# Patient Record
Sex: FEMALE | Race: WHITE | Employment: OTHER | URBAN - METROPOLITAN AREA
[De-identification: names, ages, dates, MRNs, and addresses within clinical notes are randomized per-mention and may not be internally consistent; named-entity substitution may affect disease eponyms.]

---

## 2020-04-09 ENCOUNTER — HOSPITAL ENCOUNTER (OUTPATIENT)
Dept: PREADMISSION TESTING | Age: 85
Discharge: HOME | End: 2020-04-09
Payer: MEDICARE

## 2020-04-09 DIAGNOSIS — R31.0 GROSS HEMATURIA: ICD-10-CM

## 2020-04-09 LAB
ANION GAP SERPL CALC-SCNC: 5 MMOL/L (ref 3–18)
ATRIAL RATE: 67 BPM
BUN SERPL-MCNC: 42 MG/DL (ref 7–18)
BUN/CREAT SERPL: 17 (ref 12–20)
CALCIUM SERPL-MCNC: 9 MG/DL (ref 8.5–10.1)
CALCULATED P AXIS, ECG09: 79 DEGREES
CALCULATED R AXIS, ECG10: 45 DEGREES
CALCULATED T AXIS, ECG11: 75 DEGREES
CHLORIDE SERPL-SCNC: 109 MMOL/L (ref 100–111)
CO2 SERPL-SCNC: 27 MMOL/L (ref 21–32)
CREAT SERPL-MCNC: 2.45 MG/DL (ref 0.6–1.3)
DIAGNOSIS, 93000: NORMAL
GLUCOSE SERPL-MCNC: 89 MG/DL (ref 74–99)
HCT VFR BLD AUTO: 38.5 % (ref 35–45)
HGB BLD-MCNC: 12.5 G/DL (ref 12–16)
P-R INTERVAL, ECG05: 184 MS
POTASSIUM SERPL-SCNC: 5.4 MMOL/L (ref 3.5–5.5)
Q-T INTERVAL, ECG07: 406 MS
QRS DURATION, ECG06: 104 MS
QTC CALCULATION (BEZET), ECG08: 429 MS
SODIUM SERPL-SCNC: 141 MMOL/L (ref 136–145)
VENTRICULAR RATE, ECG03: 67 BPM

## 2020-04-09 PROCEDURE — 85018 HEMOGLOBIN: CPT

## 2020-04-09 PROCEDURE — 80048 BASIC METABOLIC PNL TOTAL CA: CPT

## 2020-04-09 PROCEDURE — 36415 COLL VENOUS BLD VENIPUNCTURE: CPT

## 2020-04-09 PROCEDURE — 93005 ELECTROCARDIOGRAM TRACING: CPT

## 2020-04-20 ENCOUNTER — ANESTHESIA EVENT (OUTPATIENT)
Dept: SURGERY | Age: 85
End: 2020-04-20
Payer: MEDICARE

## 2020-04-20 ENCOUNTER — HOSPITAL ENCOUNTER (OUTPATIENT)
Dept: NUCLEAR MEDICINE | Age: 85
Discharge: HOME | End: 2020-04-20
Attending: INTERNAL MEDICINE
Payer: MEDICARE

## 2020-04-20 ENCOUNTER — HOSPITAL ENCOUNTER (OUTPATIENT)
Dept: NON INVASIVE DIAGNOSTICS | Age: 85
Discharge: HOME | End: 2020-04-20
Attending: INTERNAL MEDICINE
Payer: MEDICARE

## 2020-04-20 VITALS
DIASTOLIC BLOOD PRESSURE: 77 MMHG | WEIGHT: 202 LBS | HEIGHT: 61 IN | BODY MASS INDEX: 38.14 KG/M2 | SYSTOLIC BLOOD PRESSURE: 159 MMHG

## 2020-04-20 VITALS
SYSTOLIC BLOOD PRESSURE: 147 MMHG | DIASTOLIC BLOOD PRESSURE: 99 MMHG | BODY MASS INDEX: 40.78 KG/M2 | HEIGHT: 61 IN | WEIGHT: 216 LBS

## 2020-04-20 DIAGNOSIS — Z01.810 PREOP CARDIOVASCULAR EXAM: ICD-10-CM

## 2020-04-20 DIAGNOSIS — R94.31 ABNORMAL ELECTROCARDIOGRAM: ICD-10-CM

## 2020-04-20 LAB
AV VELOCITY RATIO: 0.63
AV VTI RATIO: 0.6
ECHO AO ROOT DIAM: 3.3 CM
ECHO AV AREA PEAK VELOCITY: 1.7 CM2
ECHO AV AREA VTI: 1.7 CM2
ECHO AV AREA/BSA PEAK VELOCITY: 0.8 CM2/M2
ECHO AV AREA/BSA VTI: 0.8 CM2/M2
ECHO AV MEAN GRADIENT: 4.5 MMHG
ECHO AV MEAN VELOCITY: 0.98 M/S
ECHO AV PEAK GRADIENT: 8.4 MMHG
ECHO AV PEAK VELOCITY: 144.82 CM/S
ECHO AV VTI: 32.44 CM
ECHO IVC PROX: 1.1 CM
ECHO LA MAJOR AXIS: 2.98 CM
ECHO LA VOL 2C: 61.32 ML (ref 22–52)
ECHO LA VOL 4C: 47.7 ML (ref 22–52)
ECHO LA VOL BP: 58.18 ML (ref 22–52)
ECHO LA VOL/BSA BIPLANE: 29.81 ML/M2 (ref 16–28)
ECHO LA VOLUME INDEX A2C: 31.42 ML/M2 (ref 16–28)
ECHO LA VOLUME INDEX A4C: 24.44 ML/M2 (ref 16–28)
ECHO LV E' LATERAL VELOCITY: 7 CM/S
ECHO LV E' SEPTAL VELOCITY: 5 CM/S
ECHO LV EDV A2C: 71.8 ML
ECHO LV EDV A4C: 87.5 ML
ECHO LV EDV BP: 72 ML (ref 56–104)
ECHO LV EDV INDEX A4C: 44.8 ML/M2
ECHO LV EDV INDEX BP: 36.9 ML/M2
ECHO LV EDV NDEX A2C: 36.8 ML/M2
ECHO LV EDV TEICHHOLZ: 0.7 ML
ECHO LV EJECTION FRACTION A2C: 52 %
ECHO LV EJECTION FRACTION A4C: 59 %
ECHO LV EJECTION FRACTION BIPLANE: 57.4 % (ref 55–100)
ECHO LV ESV A2C: 34.3 ML
ECHO LV ESV A4C: 36.1 ML
ECHO LV ESV BP: 30.7 ML (ref 19–49)
ECHO LV ESV INDEX A2C: 17.6 ML/M2
ECHO LV ESV INDEX A4C: 18.5 ML/M2
ECHO LV ESV INDEX BP: 15.7 ML/M2
ECHO LV ESV TEICHHOLZ: 0.29 ML
ECHO LV INTERNAL DIMENSION DIASTOLIC: 4.81 CM (ref 3.9–5.3)
ECHO LV INTERNAL DIMENSION SYSTOLIC: 3.32 CM
ECHO LV IVSD: 0.93 CM (ref 0.6–0.9)
ECHO LV MASS 2D: 160.2 G (ref 67–162)
ECHO LV MASS INDEX 2D: 82.1 G/M2 (ref 43–95)
ECHO LV POSTERIOR WALL DIASTOLIC: 0.79 CM (ref 0.6–0.9)
ECHO LVOT DIAM: 1.84 CM
ECHO LVOT PEAK GRADIENT: 3.3 MMHG
ECHO LVOT PEAK VELOCITY: 91.06 CM/S
ECHO LVOT VTI: 20.37 CM
ECHO MV A VELOCITY: 91.79 CM/S
ECHO MV AREA PHT: 2.9 CM2
ECHO MV E DECELERATION TIME (DT): 257.2 MS
ECHO MV E VELOCITY: 80.63 CM/S
ECHO MV E/A RATIO: 0.88
ECHO MV E/E' LATERAL: 11.52
ECHO MV E/E' RATIO (AVERAGED): 13.82
ECHO MV E/E' SEPTAL: 16.13
ECHO MV PRESSURE HALF TIME (PHT): 74.6 MS
ECHO RA AREA 4C: 13.75 CM2
ECHO RV INTERNAL DIMENSION: 2.63 CM
ECHO TRICUSPID ANNULAR PEAK SYSTOLIC VELOCITY: 1.7 CM/S
LVFS 2D: 30.98 %
LVOT MG: 1.52 MMHG
LVOT MV: 0.57 CM/S
LVSV (MOD BI): 20.15 ML
LVSV (MOD SINGLE 4C): 25.1 ML
LVSV (MOD SINGLE): 18.31 ML
LVSV (TEICH): 30.92 ML
MV DEC SLOPE: 3.14

## 2020-04-20 PROCEDURE — C8929 TTE W OR WO FOL WCON,DOPPLER: HCPCS

## 2020-04-20 PROCEDURE — 74011250636 HC RX REV CODE- 250/636

## 2020-04-20 PROCEDURE — 93017 CV STRESS TEST TRACING ONLY: CPT

## 2020-04-20 PROCEDURE — 74011250636 HC RX REV CODE- 250/636: Performed by: INTERNAL MEDICINE

## 2020-04-20 PROCEDURE — A9500 TC99M SESTAMIBI: HCPCS

## 2020-04-20 RX ADMIN — PERFLUTREN 1 ML: 6.52 INJECTION, SUSPENSION INTRAVENOUS at 11:27

## 2020-04-20 RX ADMIN — REGADENOSON 0.4 MG: 0.08 INJECTION, SOLUTION INTRAVENOUS at 10:55

## 2020-04-21 ENCOUNTER — HOSPITAL ENCOUNTER (OUTPATIENT)
Age: 85
Setting detail: OUTPATIENT SURGERY
Discharge: HOME | End: 2020-04-21
Attending: UROLOGY | Admitting: UROLOGY
Payer: MEDICARE

## 2020-04-21 ENCOUNTER — ANESTHESIA (OUTPATIENT)
Dept: SURGERY | Age: 85
End: 2020-04-21
Payer: MEDICARE

## 2020-04-21 VITALS
WEIGHT: 212.56 LBS | SYSTOLIC BLOOD PRESSURE: 120 MMHG | RESPIRATION RATE: 16 BRPM | BODY MASS INDEX: 39.12 KG/M2 | HEIGHT: 62 IN | DIASTOLIC BLOOD PRESSURE: 66 MMHG | HEART RATE: 88 BPM | OXYGEN SATURATION: 96 % | TEMPERATURE: 97.3 F

## 2020-04-21 PROBLEM — N32.89 BLADDER MASS: Status: ACTIVE | Noted: 2020-04-21

## 2020-04-21 PROCEDURE — 74011000250 HC RX REV CODE- 250: Performed by: NURSE ANESTHETIST, CERTIFIED REGISTERED

## 2020-04-21 PROCEDURE — 77030006643: Performed by: ANESTHESIOLOGY

## 2020-04-21 PROCEDURE — 77030008477 HC STYL SATN SLP COVD -A: Performed by: ANESTHESIOLOGY

## 2020-04-21 PROCEDURE — 76210000016 HC OR PH I REC 1 TO 1.5 HR: Performed by: UROLOGY

## 2020-04-21 PROCEDURE — 74011250636 HC RX REV CODE- 250/636: Performed by: UROLOGY

## 2020-04-21 PROCEDURE — 88307 TISSUE EXAM BY PATHOLOGIST: CPT

## 2020-04-21 PROCEDURE — 77030040361 HC SLV COMPR DVT MDII -B: Performed by: UROLOGY

## 2020-04-21 PROCEDURE — 74011250636 HC RX REV CODE- 250/636: Performed by: NURSE ANESTHETIST, CERTIFIED REGISTERED

## 2020-04-21 PROCEDURE — 76010000131 HC OR TIME 2 TO 2.5 HR: Performed by: UROLOGY

## 2020-04-21 PROCEDURE — 88305 TISSUE EXAM BY PATHOLOGIST: CPT

## 2020-04-21 PROCEDURE — 77030018832 HC SOL IRR H20 ICUM -A: Performed by: UROLOGY

## 2020-04-21 PROCEDURE — 76060000035 HC ANESTHESIA 2 TO 2.5 HR: Performed by: UROLOGY

## 2020-04-21 PROCEDURE — 76210000021 HC REC RM PH II 0.5 TO 1 HR: Performed by: UROLOGY

## 2020-04-21 PROCEDURE — 77030020782 HC GWN BAIR PAWS FLX 3M -B: Performed by: UROLOGY

## 2020-04-21 PROCEDURE — 77030005546 HC CATH URETH FOL 3W BARD -A: Performed by: UROLOGY

## 2020-04-21 PROCEDURE — 77030008683 HC TU ET CUF COVD -A: Performed by: ANESTHESIOLOGY

## 2020-04-21 PROCEDURE — 74011000258 HC RX REV CODE- 258: Performed by: UROLOGY

## 2020-04-21 PROCEDURE — 77030040830 HC CATH URETH FOL MDII -A: Performed by: UROLOGY

## 2020-04-21 RX ORDER — OXYCODONE HYDROCHLORIDE 5 MG/1
5 TABLET ORAL
Status: DISCONTINUED | OUTPATIENT
Start: 2020-04-21 | End: 2020-04-21 | Stop reason: HOSPADM

## 2020-04-21 RX ORDER — FLUMAZENIL 0.1 MG/ML
0.2 INJECTION INTRAVENOUS
Status: DISCONTINUED | OUTPATIENT
Start: 2020-04-21 | End: 2020-04-21 | Stop reason: HOSPADM

## 2020-04-21 RX ORDER — CEPHALEXIN 500 MG/1
500 CAPSULE ORAL 2 TIMES DAILY
Qty: 6 CAP | Refills: 0 | Status: SHIPPED | OUTPATIENT
Start: 2020-04-21 | End: 2020-04-24

## 2020-04-21 RX ORDER — SODIUM CHLORIDE 0.9 % (FLUSH) 0.9 %
5-40 SYRINGE (ML) INJECTION EVERY 8 HOURS
Status: DISCONTINUED | OUTPATIENT
Start: 2020-04-21 | End: 2020-04-21 | Stop reason: HOSPADM

## 2020-04-21 RX ORDER — ROCURONIUM BROMIDE 10 MG/ML
INJECTION, SOLUTION INTRAVENOUS AS NEEDED
Status: DISCONTINUED | OUTPATIENT
Start: 2020-04-21 | End: 2020-04-21 | Stop reason: HOSPADM

## 2020-04-21 RX ORDER — SODIUM CHLORIDE, SODIUM LACTATE, POTASSIUM CHLORIDE, CALCIUM CHLORIDE 600; 310; 30; 20 MG/100ML; MG/100ML; MG/100ML; MG/100ML
75 INJECTION, SOLUTION INTRAVENOUS CONTINUOUS
Status: DISCONTINUED | OUTPATIENT
Start: 2020-04-21 | End: 2020-04-21 | Stop reason: HOSPADM

## 2020-04-21 RX ORDER — PHENYLEPHRINE HYDROCHLORIDE 10 MG/ML
INJECTION INTRAVENOUS AS NEEDED
Status: DISCONTINUED | OUTPATIENT
Start: 2020-04-21 | End: 2020-04-21 | Stop reason: HOSPADM

## 2020-04-21 RX ORDER — NALOXONE HYDROCHLORIDE 0.4 MG/ML
0.1 INJECTION, SOLUTION INTRAMUSCULAR; INTRAVENOUS; SUBCUTANEOUS AS NEEDED
Status: DISCONTINUED | OUTPATIENT
Start: 2020-04-21 | End: 2020-04-21 | Stop reason: HOSPADM

## 2020-04-21 RX ORDER — SODIUM CHLORIDE 0.9 % (FLUSH) 0.9 %
5-40 SYRINGE (ML) INJECTION AS NEEDED
Status: DISCONTINUED | OUTPATIENT
Start: 2020-04-21 | End: 2020-04-21 | Stop reason: HOSPADM

## 2020-04-21 RX ORDER — CEFAZOLIN SODIUM 2 G/50ML
2 SOLUTION INTRAVENOUS ONCE
Status: COMPLETED | OUTPATIENT
Start: 2020-04-21 | End: 2020-04-21

## 2020-04-21 RX ORDER — LIDOCAINE HYDROCHLORIDE 20 MG/ML
INJECTION, SOLUTION EPIDURAL; INFILTRATION; INTRACAUDAL; PERINEURAL AS NEEDED
Status: DISCONTINUED | OUTPATIENT
Start: 2020-04-21 | End: 2020-04-21 | Stop reason: HOSPADM

## 2020-04-21 RX ORDER — PROPOFOL 10 MG/ML
INJECTION, EMULSION INTRAVENOUS AS NEEDED
Status: DISCONTINUED | OUTPATIENT
Start: 2020-04-21 | End: 2020-04-21 | Stop reason: HOSPADM

## 2020-04-21 RX ORDER — PROCHLORPERAZINE EDISYLATE 5 MG/ML
5 INJECTION INTRAMUSCULAR; INTRAVENOUS
Status: DISCONTINUED | OUTPATIENT
Start: 2020-04-21 | End: 2020-04-21 | Stop reason: HOSPADM

## 2020-04-21 RX ORDER — PHENAZOPYRIDINE HYDROCHLORIDE 100 MG/1
100 TABLET, FILM COATED ORAL
Qty: 9 TAB | Refills: 0 | Status: SHIPPED | OUTPATIENT
Start: 2020-04-21 | End: 2020-04-24

## 2020-04-21 RX ORDER — FENTANYL CITRATE 50 UG/ML
25 INJECTION, SOLUTION INTRAMUSCULAR; INTRAVENOUS AS NEEDED
Status: DISCONTINUED | OUTPATIENT
Start: 2020-04-21 | End: 2020-04-21 | Stop reason: HOSPADM

## 2020-04-21 RX ORDER — CEFAZOLIN SODIUM 2 G/50ML
SOLUTION INTRAVENOUS
Status: COMPLETED
Start: 2020-04-21 | End: 2020-04-21

## 2020-04-21 RX ORDER — FENTANYL CITRATE 50 UG/ML
INJECTION, SOLUTION INTRAMUSCULAR; INTRAVENOUS AS NEEDED
Status: DISCONTINUED | OUTPATIENT
Start: 2020-04-21 | End: 2020-04-21 | Stop reason: HOSPADM

## 2020-04-21 RX ORDER — SODIUM CHLORIDE, SODIUM LACTATE, POTASSIUM CHLORIDE, CALCIUM CHLORIDE 600; 310; 30; 20 MG/100ML; MG/100ML; MG/100ML; MG/100ML
125 INJECTION, SOLUTION INTRAVENOUS CONTINUOUS
Status: DISCONTINUED | OUTPATIENT
Start: 2020-04-21 | End: 2020-04-21 | Stop reason: HOSPADM

## 2020-04-21 RX ORDER — ONDANSETRON 2 MG/ML
INJECTION INTRAMUSCULAR; INTRAVENOUS AS NEEDED
Status: DISCONTINUED | OUTPATIENT
Start: 2020-04-21 | End: 2020-04-21 | Stop reason: HOSPADM

## 2020-04-21 RX ORDER — DEXAMETHASONE SODIUM PHOSPHATE 4 MG/ML
INJECTION, SOLUTION INTRA-ARTICULAR; INTRALESIONAL; INTRAMUSCULAR; INTRAVENOUS; SOFT TISSUE AS NEEDED
Status: DISCONTINUED | OUTPATIENT
Start: 2020-04-21 | End: 2020-04-21 | Stop reason: HOSPADM

## 2020-04-21 RX ADMIN — PHENYLEPHRINE HYDROCHLORIDE 50 MCG: 10 INJECTION INTRAVENOUS at 12:05

## 2020-04-21 RX ADMIN — PROPOFOL 100 MG: 10 INJECTION, EMULSION INTRAVENOUS at 11:07

## 2020-04-21 RX ADMIN — CEFAZOLIN SODIUM 2 G: 2 SOLUTION INTRAVENOUS at 10:59

## 2020-04-21 RX ADMIN — DEXAMETHASONE SODIUM PHOSPHATE 4 MG: 4 INJECTION, SOLUTION INTRAMUSCULAR; INTRAVENOUS at 11:30

## 2020-04-21 RX ADMIN — GEMCITABINE 1000 MG: 38 INJECTION INTRAVENOUS at 12:48

## 2020-04-21 RX ADMIN — PHENYLEPHRINE HYDROCHLORIDE 50 MCG: 10 INJECTION INTRAVENOUS at 12:15

## 2020-04-21 RX ADMIN — FENTANYL CITRATE 25 MCG: 50 INJECTION, SOLUTION INTRAMUSCULAR; INTRAVENOUS at 11:42

## 2020-04-21 RX ADMIN — SODIUM CHLORIDE, SODIUM LACTATE, POTASSIUM CHLORIDE, AND CALCIUM CHLORIDE 125 ML/HR: 600; 310; 30; 20 INJECTION, SOLUTION INTRAVENOUS at 10:44

## 2020-04-21 RX ADMIN — LIDOCAINE HYDROCHLORIDE 100 MG: 20 INJECTION, SOLUTION EPIDURAL; INFILTRATION; INTRACAUDAL; PERINEURAL at 11:03

## 2020-04-21 RX ADMIN — ONDANSETRON HYDROCHLORIDE 4 MG: 2 INJECTION INTRAMUSCULAR; INTRAVENOUS at 11:30

## 2020-04-21 RX ADMIN — Medication 50 MG: at 11:07

## 2020-04-21 RX ADMIN — FENTANYL CITRATE 50 MCG: 50 INJECTION, SOLUTION INTRAMUSCULAR; INTRAVENOUS at 11:03

## 2020-04-21 RX ADMIN — SUGAMMADEX 100 MG: 100 INJECTION, SOLUTION INTRAVENOUS at 12:50

## 2020-04-21 RX ADMIN — FENTANYL CITRATE 25 MCG: 50 INJECTION, SOLUTION INTRAMUSCULAR; INTRAVENOUS at 11:57

## 2020-04-21 NOTE — H&P (VIEW-ONLY)
Urology [address] Dates [id number] Tel: [phone] Fax: [phone] Patient: Jesús Egan YOB: 1935 Assessment/Plan # Detail Type Description 1. 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 Patient Plan She has a large 5.5cm bladder mass that's occupying the majority of the right side wall of the bladder moving towards the back wall of the bladder. We discussed that this is most assuredly bladder cancer considering her pos FISH tests as well. We discussed that tx would be a transurethral resection of bladder tumor, large with intravesical installation of Gemcitabine. Risk of bleeding, infection, injury to the bladder and possible need for future procedures to remain tumor free were discussed. She will proceed. We also discussed there's a possibility of needing a prolonged cath afterwards as well. 2. Assessment Gross hematuria (R31.0). Patient Plan This is undoubtedly due to her large bladder tumor. This 81year old female presents for Hematuria. History of Present Illness: 1. Hematuria The patient presents with hematuria (gross). The problem began 2 months ago. The symptoms are intermittent. The problem is unchanged. She presents with pain in the flank and left groin. The patient rates the pain 6/10. Pertinent history includes being at least 37years of age and family history of stones but not daily aspirin use, history of bladder cancer, history of irritative voiding symptoms, prior history of stones, history of renal cancer, history of UTIs and family history of bladder cancer or family history of renal cancer. Denies aggravating factors. Denies relieving factors. She denies chills, fever, nausea and vomiting. Additional information: US (3/2020) - negative for upper tract disease Cytology (02/20/2020):  POSITIVE FISH Cystoscopy (4/2/2020) -- large 6 cm bladder tumor. PAST MEDICAL/SURGICAL HISTORY   (Reviewed, updated) Disease/disorder Onset Date Management Date Comments Arthritis Hypertension Thyroid disease PROBLEM LIST:   Problem List reviewed. Medication Reconciliation Medications reconciled today. Allergies Ingredient Reaction (Severity) Medication Name Comment NO KNOWN ALLERGIES Reviewed, no changes. Family History  (Reviewed, updated) Relationship Family Member Name  Age at Death Condition Onset Age Cause of Death Family h/o    Urolithiasis Social History:  (Reviewed, updated) Tobacco use reviewed. Preferred language is [place]. MARITAL STATUS/FAMILY/SOCIAL SUPPORT Marital status:  Smoking status: Unknown if ever smoked. TOBACCO SCREENING: 
Patient has used tobacco.  
 
SMOKING STATUS Type Smoking Status Usage Per Day Years Used Pack Years Total Pack Years Unknown if ever smoked ALCOHOL There is a history of alcohol use. CAFFEINE The patient uses caffeine: coffee. Review of Systems System Neg/Pos Details Constitutional Positive Pain. Constitutional Negative Chills and Fever. ENMT Negative Ear infections and Sore throat. Eyes Negative Blurred vision, Double vision and Eye pain. Respiratory Negative Asthma, Chronic cough, Dyspnea and Wheezing. Cardio Negative Chest pain. GI Negative Constipation, Decreased appetite, Diarrhea, Nausea and Vomiting.  Positive Hematuria. Endocrine Negative Cold intolerance, Heat intolerance, Increased thirst and Weight loss. Neuro Negative Headache and Tremors. Psych Negative Anxiety and Depression. Integumentary Negative Itching skin and Rash. MS Negative Back pain and Joint pain. Hema/Lymph Negative Easy bleeding. Vital Signs Height Time ft in cm Last Measured Height Position 10:56 AM 5.0 1.00 154.94 2020 0 Weight/BSA/BMI Time lb oz kg Context BMI kg/m2 BSA m2  
 10:56 .30  91.762  38.22 Pain Scale Time Pain Score Method 10:56 AM 6/10 Numeric Pain Intensity Scale Measured By Time Measured by  
10:56 AM [facility] Physical Exam 
Exam Findings Details Constitutional Normal Well developed. Neck Exam Normal Inspection - Normal.  
Respiratory Normal Inspection - Normal.  
Abdomen Normal No abdominal tenderness. Genitourinary Normal No Suprapubic tenderness. No CVA tenderness. Extremity Normal No Edema. Psychiatric Normal Orientation - Oriented to time, place, person & situation. Appropriate mood and affect. Procedures: 
 
 
Cystoscopy indication: 
Bladder. Patient consent: 
Consent was obtained. The procedure and risks were explained in detail. Questions were encouraged and answered. The patient was prepped and draped in the usual sterile fashion. Procedure: A diagnostic cystourethroscopy was performed using a 16 Russian flexible cystoscope Anesthesia: 
No anesthesia. Patient position: 
Dorsal lithotomy. Patient response: 
Patient tolerated procedure well. Patient was given instructions. Patient was discharged in stable condition. Findings: Anterior urethra normal in appearance. The bladder has lesion/mass found on the bladder. The first lesion/mass is 6cm is located right side of the bladder with characteristics of papillary. Ureteral orifices normal in appearance. Antibiotics: Antibiotics given:  Cipro Impression: 
Gross hematuria R31.0. Active Patient Care Team Members Name Contact Agency Type Support Role Relationship Active Date Inactive Date Specialty Clement Screws   Emergency Contact Child, Mother is the Patient Lety Bal   Patient provider PCP Provider:  
 
 
Arias Chinchilla MD

## 2020-04-21 NOTE — ANESTHESIA PREPROCEDURE EVALUATION
Relevant Problems   No relevant active problems       Anesthetic History   No history of anesthetic complications            Review of Systems / Medical History  Patient summary reviewed, nursing notes reviewed and pertinent labs reviewed    Pulmonary    COPD               Neuro/Psych         TIA     Cardiovascular    Hypertension                   GI/Hepatic/Renal                Endo/Other      Hypothyroidism  Morbid obesity and arthritis     Other Findings              Physical Exam    Airway  Mallampati: II  TM Distance: 4 - 6 cm  Neck ROM: normal range of motion   Mouth opening: Normal     Cardiovascular               Dental    Dentition: Edentulous  Comments: Only a couple bottom teeth, none loose.    Pulmonary                 Abdominal  GI exam deferred       Other Findings            Anesthetic Plan    ASA: 3  Anesthesia type: general          Induction: Intravenous  Anesthetic plan and risks discussed with: Patient

## 2020-04-21 NOTE — DISCHARGE INSTRUCTIONS
Patient Education   Learning About Coronavirus [phone])  Coronavirus [phone]): Overview  What is coronavirus ([id number])? The coronavirus disease (COVID-19) is caused by a virus. It is an illness that was first found in [place], [place], in December 2019. It has since spread worldwide. The virus can cause fever, cough, and trouble breathing. In severe cases, it can cause pneumonia and make it hard to breathe without help. It can cause death. Coronaviruses are a large group of viruses. They cause the common cold. They also cause more serious illnesses like Middle East respiratory syndrome (MERS) and severe acute respiratory syndrome (SARS). COVID-19 is caused by a novel coronavirus. That means it's a new type that has not been seen in people before. This virus spreads person-to-person through droplets from coughing and sneezing. It can also spread when you are close to someone who is infected. And it can spread when you touch something that has the virus on it, such as a doorknob or a tabletop. What can you do to protect yourself from coronavirus (COVID-19)? The best way to protect yourself from getting sick is to:  · Avoid areas where there is an outbreak. · Avoid contact with people who may be infected. · Wash your hands often with soap or alcohol-based hand sanitizers. · Avoid crowds and try to stay at least 6 feet away from other people. · Wash your hands often, especially after you cough or sneeze. Use soap and water, and scrub for at least 20 seconds. If soap and water aren't available, use an alcohol-based hand . · Avoid touching your mouth, nose, and eyes. What can you do to avoid spreading the virus to others? To help avoid spreading the virus to others:  · Cover your mouth with a tissue when you cough or sneeze. Then throw the tissue in the trash. · Use a disinfectant to clean things that you touch often. · Stay home if you are sick or have been exposed to the virus.  Don't go to school, work, or public areas. And don't use public transportation. · If you are sick:  ? Leave your home only if you need to get medical care. But call the doctor's office first so they know you're coming. And wear a face mask, if you have one.  ? If you have a face mask, wear it whenever you're around other people. It can help stop the spread of the virus when you cough or sneeze. ? Clean and disinfect your home every day. Use household  and disinfectant wipes or sprays. Take special care to clean things that you grab with your hands. These include doorknobs, remote controls, phones, and handles on your refrigerator and microwave. And don't forget countertops, tabletops, bathrooms, and computer keyboards. When to call for help  Call 911 anytime you think you may need emergency care. For example, call if:  · You have severe trouble breathing. (You can't talk at all.)  · You have constant chest pain or pressure. · You are severely dizzy or lightheaded. · You are confused or can't think clearly. · Your face and lips have a blue color. · You pass out (lose consciousness) or are very hard to wake up. Call your doctor now if you develop symptoms such as:  · Shortness of breath. · Fever. · Cough. If you need to get care, call ahead to the doctor's office for instructions before you go. Make sure you wear a face mask, if you have one, to prevent exposing other people to the virus. Where can you get the latest information? The following health organizations are tracking and studying this virus. Their websites contain the most up-to-date information. Yosef Magali also learn what to do if you think you may have been exposed to the virus. · U.S. Centers for Disease Control and Prevention (CDC): The CDC provides updated news about the disease and travel advice. The website also tells you how to prevent the spread of infection.  www.cdc.gov  · World Health Organization [facility]): WHO offers information about the virus outbreaks. WHO also has travel advice. www.who.int  Current as of: April 1, 2020               Content Version: 12.4  © 2006-2020 Healthwise[employer]. Care instructions adapted under license by your healthcare professional. If you have questions about a medical condition or this instruction, always ask your healthcare professional. [address] any warranty or liability for your use of this information. DISCHARGE SUMMARY from Nurse    PATIENT INSTRUCTIONS:    After general anesthesia or intravenous sedation, for 24 hours or while taking prescription Narcotics:  · Limit your activities  · Do not drive and operate hazardous machinery  · Do not make important personal or business decisions  · Do  not drink alcoholic beverages  · If you have not urinated within 8 hours after discharge, please contact your surgeon on call. Report the following to your surgeon:  · Excessive pain, swelling, redness or odor of or around the surgical area  · Temperature over 100.5  · Nausea and vomiting lasting longer than 4 hours or if unable to take medications  · Any signs of decreased circulation or nerve impairment to extremity: change in color, persistent  numbness, tingling, coldness or increase pain  · Any questions    What to do at Home:  REGULAR DIET DRINK LOTS OF LIQUIDS  OK TO SHOWER  DOUBLE FLUSH ALL URINE WITH BLEACH FOR 48 HRS  [address] ALL CLOTHING AND LINENS SEPARATE FROM OTHERS FOR 48 HRS  NO SEX FOR 48 HRS  DR Dex Andujar WILL CALL REGARDING A POST OP CHECK UP    If you experience any of the following symptoms heavy bleeding, unable to void, fevers, severe pain, please follow up with dr Edison Woods    *  Please give a list of your current medications to your Primary Care Provider. *  Please update this list whenever your medications are discontinued, doses are      changed, or new medications (including over-the-counter products) are added.     *  Please carry medication information at all times in case of emergency situations. These are general instructions for a healthy lifestyle:    No smoking/ No tobacco products/ Avoid exposure to second hand smoke  Surgeon General's Warning:  Quitting smoking now greatly reduces serious risk to your health. Obesity, smoking, and sedentary lifestyle greatly increases your risk for illness    A healthy diet, regular physical exercise & weight monitoring are important for maintaining a healthy lifestyle    You may be retaining fluid if you have a history of heart failure or if you experience any of the following symptoms:  Weight gain of 3 pounds or more overnight or 5 pounds in a week, increased swelling in our hands or feet or shortness of breath while lying flat in bed. Please call your doctor as soon as you notice any of these symptoms; do not wait until your next office visit. The discharge information has been reviewed with the patient and caregiver. The patient and caregiver verbalized understanding. Discharge medications reviewed with the patient and caregiver and appropriate educational materials and side effects teaching were provided.   ___________________________________________________________________________________________________________________________________    Patient armband removed and shredded

## 2020-04-21 NOTE — ANESTHESIA POSTPROCEDURE EVALUATION
Post-Anesthesia Evaluation and Assessment    Cardiovascular Function/Vital Signs  Visit Vitals  /67   Pulse 78   Temp 36.3 °C (97.3 °F)   Resp 17   Ht 5' 1.5\" (1.562 m)   Wt 96.4 kg (212 lb 9 oz)   SpO2 99%   BMI 39.51 kg/m²       Patient is status post Procedure(s):  CYSTOSCOPY, TRANSURETHRAL RESECTION OF BLADDER TUMOR- LARGE WITH GEMCITABINE INSTILLATION. Nausea/Vomiting: Controlled. Postoperative hydration reviewed and adequate. Pain:  Pain Scale 1: Numeric (0 - 10) (04/21/20 1420)  Pain Intensity 1: 0 (04/21/20 1420)   Managed. Neurological Status:   Neuro (WDL): Exceptions to WDL (04/21/20 1420)   At baseline. Mental Status and Level of Consciousness: Arousable. Pulmonary Status:   O2 Device: Nasal cannula (04/21/20 1420)   Adequate oxygenation and airway patent. Complications related to anesthesia: None    Post-anesthesia assessment completed. No concerns. Patient has met all discharge requirements.     Signed By: Hortensia Peabody, CRNA    April 21, 2020

## 2020-04-21 NOTE — OP NOTES
[facility]  OPERAT[facility] REPORT    Name:  Mine Carcamo  MR#:   [id number]  :  1935  ACCOUNT #:  [de-identified]  DATE OF SERVICE:  2020    PREOPERATIVE DIAGNOSIS:  Neoplasm of bladder, unspecified. POSTOPERATIVE DIAGNOSIS:  Neoplasm of bladder, unspecified. PROCEDURE PERFORMED:  Cystoscopy, transurethral resection of bladder tumor, large, with intravesical instillation of gemcitabine. SURGEON:  Luz Johnson MD    ASSISTANT:  None. ANESTHESIA:  General.    COMPLICATIONS:  None. SPECIMENS REMOVED:  1.  Bladder tumor. 2.  Deeper bites. IMPLANTS:  A 22-Honduran three-way Dueñas catheter. DRAINS:  None. ESTIMATED BLOOD LOSS:  Minimal.    FINDINGS:  This is a very difficult case. There was a large, approximately 6-cm tumor extending from the dome of the bladder towards the right side, and it was so long that even when the bladder was full extended almost all the way down to the floor of the bladder, it was hanging there very loosely. There was some necrotic debris. Behind that tumor and then towards the left side of the dome, there was another large tumor extending down from the dome. In between the tumors, there were a lot of papillary fronds on the mucosa. After resecting the large tumors, visualization was very difficult due to the cloudiness of the scopes, tumor debris, air pockets from the resection and cautery at the dome and then bleeding as well. I was able to control the bleeding, but at the end of the case, it was impossible to assess the full extent of the resection and that anything was left behind. On gross inspection, there was certainly no large tumor left behind or anything medium size that I could tell. DISPOSITION:  The patient was taken to the recovery in stable condition. INDICATIONS:  The patient is an 70-year-old female with gross hematuria, who was found to have a large bladder tumor. She presents for TURBT.     PROCEDURE: Preoperatively, the risks and benefits of the surgery were described to the patient where the risks include but were not limited to bleeding, infection, injury to the bladder, injury to the ureter, injury to the kidney, possible need for future procedures. The patient understood the risks and signed the informed consent. The patient was taken to the operating room and placed on the OR table in supine position. She was administered a general anesthetic. She was administered intravenous antibiotics. She was placed in dorsal lithotomy position and prepped and draped in the usual sterile manner. A 25-Upper sorbian resectoscope and sheath were then inserted transurethrally atraumatically under direct vision using a visual obturator and 30-degree lens. A panendoscopy was done. The urethra was normal.  Bilateral ureteral orifices were in normal anatomic position. There was a huge amount of tumor that was just hanging down from the dome towards the right side and then on the left side as well making visualization very difficult. It would hang from the dome and papillary fronds were extending all the way down to the floor of the bladder even when the bladder was fully distended. Using a loop resectoscope, I then began to resect the large 6-cm tumor on the right sidewall of the dome. This was extremely difficult and challenging. Once I was able to finally get it down to the point that I was even with the bladder mucosa. All the pieces were evacuated out of the bladder and bleeding was controlled. I then did the same thing with the large tumor just behind into the left of the right side of the dome. This tumor was also challenging, although not as much as the first one. Once I got that level with the bladder mucosa, all the pieces were evacuated using the [facility] evacuator. This was sent off as bladder tumor. I controlled all bleeding.   In between the two tumor sites, there were a lot of innumerable papillary fronds along the mucosa. I used a cut biopsy forceps, and I biopsied the base of the tumors and these were sent as deep bites. I cauterized everything. The case became exceedingly difficult because of all the debris and packets of air at the dome and the cloudiness of the scopes from being directed up towards the dome for such an extended period of time. At this point, I deemed it was no longer safe to continue. Once all bleeding was controlled with cautery, I removed the scope. A 22-Micronesian three-way Dueñas catheter was placed and the urine was drained. The third part was capped. I then instilled a gram of gemcitabine into the bladder. The capsule was plugged. At this point, the patient was taken out of lithotomy position, revived from anesthesia and taken to recovery in stable condition.       Colt Schmid MD      [id number]  D:  04/21/2020 13:20  T:  04/21/2020 14:30  JOB #:  [id number]

## 2020-04-21 NOTE — PERIOP NOTES
Reviewed PTA medication list with patient/caregiver and patient/caregiver denies any additional medications. Patient admits to having a responsible adult care for them at home for at least 24 hours after surgery. Patient encouraged to use gown warming system and informed that using said warming gown to regulate body temperature prior to a procedure has been shown to help reduce the risks of blood clots and infection. Dual skin assessment & fall risk band verification completed with Gerson Turner RN.

## 2020-04-21 NOTE — INTERVAL H&P NOTE
Update History & Physical 
 
The Patient's History and Physical of April 8, 2020  
 was reviewed with the patient and I examined the patient. There was no change. The surgical site was confirmed by the patient and me. Plan:  The risk, benefits, expected outcome, and alternative to the recommended procedure have been discussed with the patient. Patient understands and wants to proceed with the procedure.  
 
Electronically signed by Noelle Kurtz MD on 4/21/2020 at 10:41 AM

## 2020-04-21 NOTE — BRIEF OP NOTE
Brief Postoperative Note    Patient: Janna Tinsley  YOB: 1935  MRN: [id number]    Date of Procedure: 4/21/2020     Pre-Op Diagnosis: NEOPLASM, UNSPECIFIED BEHAVIOR BLADDER    Post-Op Diagnosis: Same as preoperative diagnosis. Procedure(s):  CYSTOSCOPY, TRANSURETHRAL RESECTION OF BLADDER TUMOR- LARGE WITH INTRAVESICAL INSTILLATION GEMCITABINE    Surgeon(s):  Geno Hill MD    Surgical Assistant: None    Anesthesia: General     Estimated Blood Loss (mL): Minimal    Complications: None    Specimens:   ID Type Source Tests Collected by Time Destination   1 : BLADDER TUMOR Preservative Bladder  Geno Hill MD 4/21/2020 1229 Pathology   2 : [facility] BITES Preservative Bladder  Geno Hill MD 4/21/2020 1232 Pathology        Implants: 22 fr 3-way moreno catheter    Drains: * No LDAs found *    Findings: A very difficult case. There was a large approx 6 cm tumor extending from the dome toward the right side wall that was a relatively narrow stalk. Behind and to the left, there was another large tumor extending down from the dome. In between the tumors there was a lot of papillary fronds. After resecting the large tumors, visualization was very difficult due to cloudiness of the scopes, debris, air pockets at the dome and bleeding. I was able to control the bleeding, but it was impossible to assess the extent of the resection.      Electronically Signed by Lina Grullon MD on 4/21/2020 at 1:10 PM

## 2020-04-21 NOTE — H&P
Urology [address] [place] [address]  Tel: [phone]  Fax: [phone]      Patient: Constantino Santos  YOB: 1935          Assessment/Plan  # Detail Type Description    1. 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 Patient Plan She has a large 5.5cm bladder mass that's occupying the majority of the right side wall of the bladder moving towards the back wall of the bladder. We discussed that this is most assuredly bladder cancer considering her pos FISH tests as well. We discussed that tx would be a transurethral resection of bladder tumor, large with intravesical installation of Gemcitabine. Risk of bleeding, infection, injury to the bladder and possible need for future procedures to remain tumor free were discussed. She will proceed. We also discussed there's a possibility of needing a prolonged cath afterwards as well. 2. Assessment Gross hematuria (R31.0). Patient Plan This is undoubtedly due to her large bladder tumor. This 81year old female presents for Hematuria. History of Present Illness:  1. Hematuria   The patient presents with hematuria (gross). The problem began 2 months ago. The symptoms are intermittent. The problem is unchanged. She presents with pain in the flank and left groin. The patient rates the pain 6/10. Pertinent history includes being at least 37years of age and family history of stones but not daily aspirin use, history of bladder cancer, history of irritative voiding symptoms, prior history of stones, history of renal cancer, history of UTIs and family history of bladder cancer or family history of renal cancer. Denies aggravating factors. Denies relieving factors. She denies chills, fever, nausea and vomiting. Additional information: US (3/2020) - negative for upper tract disease  Cytology (02/20/2020):  POSITIVE FISH    Cystoscopy (4/2/2020) -- large 6 cm bladder tumor. PAST MEDICAL/SURGICAL HISTORY   (Reviewed, updated)    Disease/disorder Onset Date Management Date Comments   Arthritis       Hypertension       Thyroid disease             PROBLEM LIST:   Problem List reviewed. Medication Reconciliation  Medications reconciled today. Allergies  Ingredient Reaction (Severity) Medication Name Comment   NO KNOWN ALLERGIES        Reviewed, no changes. Family History  (Reviewed, updated)  Relationship Family Member Name  Age at Death Condition Onset Age Cause of Death   Family h/o    Urolithiasis           Social History:  (Reviewed, updated)  Tobacco use reviewed. Preferred language is [place]. MARITAL STATUS/FAMILY/SOCIAL SUPPORT  Marital status:    Smoking status: Unknown if ever smoked. TOBACCO SCREENING:  Patient has used tobacco.     SMOKING STATUS  Type Smoking Status Usage Per Day Years Used Pack Years Total Pack Years    Unknown if ever smoked         ALCOHOL  There is a history of alcohol use. CAFFEINE  The patient uses caffeine: coffee. Review of Systems  System Neg/Pos Details   Constitutional Positive Pain. Constitutional Negative Chills and Fever. ENMT Negative Ear infections and Sore throat. Eyes Negative Blurred vision, Double vision and Eye pain. Respiratory Negative Asthma, Chronic cough, Dyspnea and Wheezing. Cardio Negative Chest pain. GI Negative Constipation, Decreased appetite, Diarrhea, Nausea and Vomiting.  Positive Hematuria. Endocrine Negative Cold intolerance, Heat intolerance, Increased thirst and Weight loss. Neuro Negative Headache and Tremors. Psych Negative Anxiety and Depression. Integumentary Negative Itching skin and Rash. MS Negative Back pain and Joint pain. Hema/Lymph Negative Easy bleeding.      Vital Signs   Height  Time ft in cm Last Measured Height Position   10:56 AM 5.0 1.00 154.94 2020 0   Weight/BSA/BMI  Time lb oz kg Context BMI kg/m2 BSA m2 10:56 .30  91.762  38.22    Pain Scale  Time Pain Score Method   10:56 AM 6/10 Numeric Pain Intensity Scale   Measured By  Time Measured by   10:56 AM Lizbeth Newton     Physical Exam  Exam Findings Details   Constitutional Normal Well developed. Neck Exam Normal Inspection - Normal.   Respiratory Normal Inspection - Normal.   Abdomen Normal No abdominal tenderness. Genitourinary Normal No Suprapubic tenderness. No CVA tenderness. Extremity Normal No Edema. Psychiatric Normal Orientation - Oriented to time, place, person & situation. Appropriate mood and affect. Procedures:      Cystoscopy indication:  Bladder. Patient consent:  Consent was obtained. The procedure and risks were explained in detail. Questions were encouraged and answered. The patient was prepped and draped in the usual sterile fashion. Procedure:  A diagnostic cystourethroscopy was performed using a 16 Syriac flexible cystoscope  Anesthesia:  No anesthesia. Patient position:  Dorsal lithotomy. Patient response:  Patient tolerated procedure well. Patient was given instructions. Patient was discharged in stable condition. Findings:  Anterior urethra normal in appearance. The bladder has lesion/mass found on the bladder. The first lesion/mass is 6cm is located right side of the bladder with characteristics of papillary. Ureteral orifices normal in appearance. Antibiotics:  Antibiotics given:  Cipro  Impression:  Gross hematuria R31.0.         Active Patient Care Team Members    Name Contact Agency Type Support Role Relationship Active Date Inactive Date Specialty   Brena Leap   Emergency Contact Child, Mother is the Patient      Teodorobess Altagracia   Patient provider PCP          Provider:       Rea Guidry MD

## 2020-05-05 ENCOUNTER — HOSPITAL ENCOUNTER (OUTPATIENT)
Dept: PREADMISSION TESTING | Age: 85
Discharge: HOME | End: 2020-05-05
Payer: MEDICARE

## 2020-05-05 PROCEDURE — 87635 SARS-COV-2 COVID-19 AMP PRB: CPT

## 2020-05-06 LAB — SARS-COV-2, COV2NT: NOT DETECTED

## 2020-05-11 ENCOUNTER — ANESTHESIA EVENT (OUTPATIENT)
Dept: SURGERY | Age: 85
End: 2020-05-11
Payer: MEDICARE

## 2020-05-12 ENCOUNTER — ANESTHESIA (OUTPATIENT)
Dept: SURGERY | Age: 85
End: 2020-05-12
Payer: MEDICARE

## 2020-05-12 ENCOUNTER — HOSPITAL ENCOUNTER (OUTPATIENT)
Age: 85
Setting detail: OUTPATIENT SURGERY
Discharge: HOME | End: 2020-05-12
Attending: UROLOGY | Admitting: UROLOGY
Payer: MEDICARE

## 2020-05-12 VITALS
TEMPERATURE: 96.8 F | RESPIRATION RATE: 16 BRPM | SYSTOLIC BLOOD PRESSURE: 131 MMHG | HEIGHT: 61 IN | BODY MASS INDEX: 40.85 KG/M2 | OXYGEN SATURATION: 96 % | HEART RATE: 69 BPM | WEIGHT: 216.38 LBS | DIASTOLIC BLOOD PRESSURE: 51 MMHG

## 2020-05-12 DIAGNOSIS — C67.8 MALIGNANT NEOPLASM OF OVERLAPPING SITES OF BLADDER (HCC): Primary | ICD-10-CM

## 2020-05-12 PROCEDURE — 76010000149 HC OR TIME 1 TO 1.5 HR: Performed by: UROLOGY

## 2020-05-12 PROCEDURE — 74011250636 HC RX REV CODE- 250/636: Performed by: UROLOGY

## 2020-05-12 PROCEDURE — 77030040361 HC SLV COMPR DVT MDII -B: Performed by: UROLOGY

## 2020-05-12 PROCEDURE — 76060000033 HC ANESTHESIA 1 TO 1.5 HR: Performed by: UROLOGY

## 2020-05-12 PROCEDURE — 74011000250 HC RX REV CODE- 250: Performed by: NURSE ANESTHETIST, CERTIFIED REGISTERED

## 2020-05-12 PROCEDURE — 77030020782 HC GWN BAIR PAWS FLX 3M -B: Performed by: UROLOGY

## 2020-05-12 PROCEDURE — 88307 TISSUE EXAM BY PATHOLOGIST: CPT

## 2020-05-12 PROCEDURE — 77030012508 HC MSK AIRWY LMA AMBU -A: Performed by: ANESTHESIOLOGY

## 2020-05-12 PROCEDURE — 77030034696 HC CATH URETH FOL 2W BARD -A: Performed by: UROLOGY

## 2020-05-12 PROCEDURE — 77030018832 HC SOL IRR H20 ICUM -A: Performed by: UROLOGY

## 2020-05-12 PROCEDURE — 76210000063 HC OR PH I REC FIRST 0.5 HR: Performed by: UROLOGY

## 2020-05-12 PROCEDURE — 88305 TISSUE EXAM BY PATHOLOGIST: CPT

## 2020-05-12 PROCEDURE — 74011250636 HC RX REV CODE- 250/636: Performed by: NURSE ANESTHETIST, CERTIFIED REGISTERED

## 2020-05-12 PROCEDURE — 76210000021 HC REC RM PH II 0.5 TO 1 HR: Performed by: UROLOGY

## 2020-05-12 RX ORDER — PROPOFOL 10 MG/ML
INJECTION, EMULSION INTRAVENOUS AS NEEDED
Status: DISCONTINUED | OUTPATIENT
Start: 2020-05-12 | End: 2020-05-12 | Stop reason: HOSPADM

## 2020-05-12 RX ORDER — CEPHALEXIN 250 MG/1
500 CAPSULE ORAL 2 TIMES DAILY
Qty: 12 CAP | Refills: 0 | Status: SHIPPED | OUTPATIENT
Start: 2020-05-12 | End: 2020-05-15

## 2020-05-12 RX ORDER — EPHEDRINE SULFATE/0.9% NACL/PF 50 MG/5 ML
SYRINGE (ML) INTRAVENOUS AS NEEDED
Status: DISCONTINUED | OUTPATIENT
Start: 2020-05-12 | End: 2020-05-12 | Stop reason: HOSPADM

## 2020-05-12 RX ORDER — FENTANYL CITRATE 50 UG/ML
INJECTION, SOLUTION INTRAMUSCULAR; INTRAVENOUS AS NEEDED
Status: DISCONTINUED | OUTPATIENT
Start: 2020-05-12 | End: 2020-05-12 | Stop reason: HOSPADM

## 2020-05-12 RX ORDER — TRAMADOL HYDROCHLORIDE 50 MG/1
50 TABLET ORAL
Qty: 10 TAB | Refills: 0 | Status: SHIPPED | OUTPATIENT
Start: 2020-05-12 | End: 2020-05-15

## 2020-05-12 RX ORDER — SODIUM CHLORIDE 0.9 % (FLUSH) 0.9 %
5-40 SYRINGE (ML) INJECTION AS NEEDED
Status: DISCONTINUED | OUTPATIENT
Start: 2020-05-12 | End: 2020-05-12 | Stop reason: HOSPADM

## 2020-05-12 RX ORDER — LIDOCAINE HYDROCHLORIDE 20 MG/ML
INJECTION, SOLUTION EPIDURAL; INFILTRATION; INTRACAUDAL; PERINEURAL AS NEEDED
Status: DISCONTINUED | OUTPATIENT
Start: 2020-05-12 | End: 2020-05-12 | Stop reason: HOSPADM

## 2020-05-12 RX ORDER — ONDANSETRON 2 MG/ML
INJECTION INTRAMUSCULAR; INTRAVENOUS AS NEEDED
Status: DISCONTINUED | OUTPATIENT
Start: 2020-05-12 | End: 2020-05-12 | Stop reason: HOSPADM

## 2020-05-12 RX ORDER — SODIUM CHLORIDE, SODIUM LACTATE, POTASSIUM CHLORIDE, CALCIUM CHLORIDE 600; 310; 30; 20 MG/100ML; MG/100ML; MG/100ML; MG/100ML
125 INJECTION, SOLUTION INTRAVENOUS CONTINUOUS
Status: DISCONTINUED | OUTPATIENT
Start: 2020-05-12 | End: 2020-05-12 | Stop reason: HOSPADM

## 2020-05-12 RX ORDER — HYDROMORPHONE HYDROCHLORIDE 1 MG/ML
0.5 INJECTION, SOLUTION INTRAMUSCULAR; INTRAVENOUS; SUBCUTANEOUS
Status: DISCONTINUED | OUTPATIENT
Start: 2020-05-12 | End: 2020-05-12 | Stop reason: HOSPADM

## 2020-05-12 RX ORDER — SODIUM CHLORIDE 0.9 % (FLUSH) 0.9 %
5-40 SYRINGE (ML) INJECTION EVERY 8 HOURS
Status: DISCONTINUED | OUTPATIENT
Start: 2020-05-12 | End: 2020-05-12 | Stop reason: HOSPADM

## 2020-05-12 RX ORDER — CEFAZOLIN SODIUM 2 G/50ML
2 SOLUTION INTRAVENOUS ONCE
Status: COMPLETED | OUTPATIENT
Start: 2020-05-12 | End: 2020-05-12

## 2020-05-12 RX ORDER — FENTANYL CITRATE 50 UG/ML
25 INJECTION, SOLUTION INTRAMUSCULAR; INTRAVENOUS AS NEEDED
Status: DISCONTINUED | OUTPATIENT
Start: 2020-05-12 | End: 2020-05-12 | Stop reason: HOSPADM

## 2020-05-12 RX ADMIN — LIDOCAINE HYDROCHLORIDE 80 MG: 20 INJECTION, SOLUTION EPIDURAL; INFILTRATION; INTRACAUDAL; PERINEURAL at 07:42

## 2020-05-12 RX ADMIN — PHENYLEPHRINE HYDROCHLORIDE 100 MCG: 10 INJECTION INTRAVENOUS at 08:22

## 2020-05-12 RX ADMIN — Medication 10 MG: at 07:51

## 2020-05-12 RX ADMIN — Medication 10 MG: at 07:45

## 2020-05-12 RX ADMIN — PHENYLEPHRINE HYDROCHLORIDE 100 MCG: 10 INJECTION INTRAVENOUS at 08:12

## 2020-05-12 RX ADMIN — SODIUM CHLORIDE, SODIUM LACTATE, POTASSIUM CHLORIDE, AND CALCIUM CHLORIDE 125 ML/HR: 600; 310; 30; 20 INJECTION, SOLUTION INTRAVENOUS at 06:39

## 2020-05-12 RX ADMIN — PHENYLEPHRINE HYDROCHLORIDE 100 MCG: 10 INJECTION INTRAVENOUS at 07:51

## 2020-05-12 RX ADMIN — PROPOFOL 40 MG: 10 INJECTION, EMULSION INTRAVENOUS at 07:43

## 2020-05-12 RX ADMIN — FENTANYL CITRATE 50 MCG: 50 INJECTION, SOLUTION INTRAMUSCULAR; INTRAVENOUS at 07:32

## 2020-05-12 RX ADMIN — PHENYLEPHRINE HYDROCHLORIDE 50 MCG: 10 INJECTION INTRAVENOUS at 07:45

## 2020-05-12 RX ADMIN — CEFAZOLIN SODIUM 2 G: 2 SOLUTION INTRAVENOUS at 07:36

## 2020-05-12 RX ADMIN — ONDANSETRON HYDROCHLORIDE 4 MG: 2 INJECTION INTRAMUSCULAR; INTRAVENOUS at 07:54

## 2020-05-12 RX ADMIN — PROPOFOL 100 MG: 10 INJECTION, EMULSION INTRAVENOUS at 07:42

## 2020-05-12 RX ADMIN — SODIUM CHLORIDE, SODIUM LACTATE, POTASSIUM CHLORIDE, AND CALCIUM CHLORIDE: 600; 310; 30; 20 INJECTION, SOLUTION INTRAVENOUS at 08:37

## 2020-05-12 NOTE — INTERVAL H&P NOTE
Update History & Physical 
 
The Patient's History and Physical of April 21, 2020 was reviewed with the patient and I examined the patient. There was significant change. She underwent a TURBT large. Given the location along the side wall and dome, it was impossible for a complete resection with air bubbles and pockets. She presents for a repeat resection today. The surgical site was confirmed by the patient and me. Plan:  The risk, benefits, expected outcome, and alternative to the recommended procedure have been discussed with the patient. Patient understands and wants to proceed with the procedure.  
 
Electronically signed by Jatinder Michel MD on 5/12/2020 at 7:26 AM

## 2020-05-12 NOTE — ANESTHESIA POSTPROCEDURE EVALUATION
Post-Anesthesia Evaluation and Assessment    Cardiovascular Function/Vital Signs  Visit Vitals  /67   Pulse 75   Temp 36.2 °C (97.1 °F)   Resp 15   Ht 5' 1\" (1.549 m)   Wt 98.1 kg (216 lb 6 oz)   SpO2 95%   BMI 40.88 kg/m²       Patient is status post Procedure(s):  CYSTOSCOPY, TRANSURETHRAL RESECTION OF BLADDER TUMOR- Medium, \"SPEC POP\". Nausea/Vomiting: Controlled. Postoperative hydration reviewed and adequate. Pain:  Pain Scale 1: Numeric (0 - 10) (05/12/20 0853)  Pain Intensity 1: 0 (05/12/20 0853)   Managed. Neurological Status:   Neuro (WDL): Exceptions to WDL (05/12/20 0641)   At baseline. Mental Status and Level of Consciousness: Baseline and stable. Pulmonary Status:   O2 Device: Room air (05/12/20 0853)   Adequate oxygenation and airway patent. Complications related to anesthesia: None    Post-anesthesia assessment completed. No concerns. Patient has met all discharge requirements.     Signed By: Jignesh Humphreys MD

## 2020-05-12 NOTE — PERIOP NOTES
Reviewed PTA medication list with patient/caregiver and patient/caregiver denies any additional medications. Patient admits to having a responsible adult care for them for at least 24 hours after surgery.     Dual skin assessment completed by Jovanna Real RN and Jian Vogt RN.

## 2020-05-12 NOTE — BRIEF OP NOTE
Brief Postoperative Note    Patient: Arie Bernstein  YOB: 1935  MRN: [id number]    Date of Procedure: 5/12/2020     Pre-Op Diagnosis: BLADDER CANCER OVERLAPPING SITES    Post-Op Diagnosis: Same as preoperative diagnosis. Procedure(s):  CYSTOSCOPY, TRANSURETHRAL RESECTION OF BLADDER TUMOR- Medium,     Surgeon(s):  Brandon Manuel MD    Surgical Assistant: None    Anesthesia: General     Estimated Blood Loss (mL): Minimal    Complications: None    Specimens:   ID Type Source Tests Collected by Time Destination   1 : Bladder Tumor Preservative Bladder  Brandon Manuel MD 5/12/2020 8259 Pathology        Implants: * No implants in log *    Drains: 22 fr moreno catheter    Findings: 3  cm area of resection in between 2 large tumor sites from last month and then area re-biopsied as well. .  No active bleeding or injury noted at end of case    Electronically Signed by Yohannes Pool MD on 5/12/2020 at 8:46 AM

## 2020-05-12 NOTE — ANESTHESIA PREPROCEDURE EVALUATION
Relevant Problems   No relevant active problems       Anesthetic History   No history of anesthetic complications            Review of Systems / Medical History  Patient summary reviewed, nursing notes reviewed and pertinent labs reviewed    Pulmonary    COPD      Smoker         Neuro/Psych       CVA  TIA and psychiatric history     Cardiovascular    Hypertension              Exercise tolerance: >4 METS     GI/Hepatic/Renal  Within defined limits              Endo/Other      Hypothyroidism  Morbid obesity and arthritis     Other Findings            Physical Exam    Airway  Mallampati: III  TM Distance: < 4 cm  Neck ROM: decreased range of motion   Mouth opening: Diminished (comment)     Cardiovascular  Regular rate and rhythm,  S1 and S2 normal,  no murmur, click, rub, or gallop  Rhythm: regular  Rate: normal         Dental    Dentition: Edentulous     Pulmonary  Breath sounds clear to auscultation               Abdominal  GI exam deferred       Other Findings            Anesthetic Plan    ASA: 3  Anesthesia type: general          Induction: Intravenous  Anesthetic plan and risks discussed with: Patient and Son / Daughter

## 2020-05-12 NOTE — DISCHARGE INSTRUCTIONS
Patient Education   Patient Education        Cystoscopy: What to Expect at Home  Your Recovery    A cystoscopy is a procedure that lets a doctor look inside of the bladder and the urethra. The urethra is the tube that carries urine from the bladder to outside the body. The doctor uses a thin, lighted tool called a cystoscope. Your bladder is filled with fluid. This stretches the bladder so that your doctor can look closely at the inside of your bladder. After the cystoscopy, your urethra may be sore at first, and it may burn when you urinate for the first few days after the procedure. You may feel the need to urinate more often, and your urine may be pink. These symptoms should get better in 1 or 2 days. You will probably be able to go back to most of your usual activities in 1 or 2 days. This care sheet gives you a general idea about how long it will take for you to recover. But each person recovers at a different pace. Follow the steps below to get better as quickly as possible. How can you care for yourself at home? Activity    · Rest when you feel tired. Getting enough sleep will help you recover.     · Try to walk each day. Start by walking a little more than you did the day before. Bit by bit, increase the amount you walk. Walking boosts blood flow and helps prevent pneumonia and constipation.     · Avoid strenuous activities, such as bicycle riding, jogging, weight lifting, or aerobic exercise, until your doctor says it is okay.     · Ask your doctor when you can drive again.     · Most people are able to return to work within 1 or 2 days after the procedure.     · You may shower and take baths as usual.     · Ask your doctor when it is okay for you to have sex. Diet    · You can eat your normal diet. If your stomach is upset, try bland, low-fat foods like plain rice, broiled chicken, toast, and yogurt.     · Drink plenty of fluids (unless your doctor tells you not to).    Medicines    · Take pain medicines exactly as directed. ? If the doctor gave you a prescription medicine for pain, take it as prescribed. ? If you are not taking a prescription pain medicine, ask your doctor if you can take an over-the-counter medicine.     · If you think your pain medicine is making you sick to your stomach:  ? Take your medicine after meals (unless your doctor has told you not to). ? Ask your doctor for a different pain medicine.     · If your doctor prescribed antibiotics, take them as directed. Do not stop taking them just because you feel better. You need to take the full course of antibiotics. Follow-up care is a key part of your treatment and safety. Be sure to make and go to all appointments, and call your doctor if you are having problems. It's also a good idea to know your test results and keep a list of the medicines you take. When should you call for help? Call 911 anytime you think you may need emergency care. For example, call if:    · You passed out (lost consciousness).     · You have severe trouble breathing.     · You have sudden chest pain and shortness of breath, or you cough up blood.     · You have severe belly pain.    Call your doctor now or seek immediate medical care if:    · You are sick to your stomach or cannot keep fluids down.     · Your urine is still red or you see blood clots after you have urinated several times.     · You have trouble passing urine or stool, especially if you have pain or swelling in your lower belly.     · You have signs of a blood clot, such as:  ? Pain in your calf, back of the knee, thigh, or groin. ? Redness and swelling in your leg or groin.     · You develop a fever or severe chills.     · You have pain in your back just below your rib cage. This is called flank pain.    Watch closely for changes in your health, and be sure to contact your doctor if:    · You have pain or burning when you urinate.  A burning feeling is normal for a day or two after the test, but call if it does not get better.     · You have a frequent urge to urinate but can pass only small amounts of urine.     · Your urine is pink, red, or cloudy, or smells bad. It is normal for the urine to have a pinkish color for a few days after the test, but call if it does not get better. Where can you learn more? Go to http://solange-paula.info/  Enter C842 in the search box to learn more about \"Cystoscopy: What to Expect at Home. \"  Current as of: August 21, 2019Content Version: 12.4  © 9047-2081 [employer]. Care instructions adapted under license by [employer] (which disclaims liability or warranty for this information). If you have questions about a medical condition or this instruction, always ask your healthcare professional. [address] any warranty or liability for your use of this information. 10 Things to Do When You Have COVID-19    Stay home. Don't go to school, work, or public areas. And don't use public transportation. Leave your home only if you need to get medical care. But call the doctor's office first so they know you're coming. And wear a cloth face cover. Ask before leaving isolation. Talk with your doctor or other health professional about when it will be safe for you to leave isolation. Wear a cloth face cover when you are around other people. It can help stop the spread of the virus when you cough or sneeze. Limit contact with people in your home. If possible, stay in a separate bedroom and use a separate bathroom. Avoid contact with pets and other animals. If possible, have a friend or family member care for them while you're sick. Cover your mouth and nose with a tissue when you cough or sneeze. Then throw the tissue in the trash right away. Wash your hands often, especially after you cough or sneeze. Use soap and water, and scrub for at least 20 seconds.  If soap and water aren't available, use an alcohol-based hand . Don't share personal household items. These include bedding, towels, cups and glasses, and eating utensils. Clean and disinfect your home every day. Use household  or disinfectant wipes or sprays. Take special care to clean things that you grab with your hands. These include doorknobs, remote controls, phones, and handles on your refrigerator and microwave. And don't forget countertops, tabletops, bathrooms, and computer keyboards. Take acetaminophen (Tylenol) to relieve fever and body aches. Read and follow all instructions on the label. Current as of: April 15, 2020               Content Version: 12.4  © 2006-2020 [employer]. Care instructions adapted under license by your healthcare professional. If you have questions about a medical condition or this instruction, always ask your healthcare professional. [place] [address] any warranty or liability for your use of this information. DISCHARGE SUMMARY from Nurse    PATIENT INSTRUCTIONS:    After general anesthesia or intravenous sedation, for 24 hours or while taking prescription Narcotics:  · Limit your activities  · Do not drive and operate hazardous machinery  · Do not make important personal or business decisions  · Do  not drink alcoholic beverages  · If you have not urinated within 8 hours after discharge, please contact your surgeon on call.     Report the following to your surgeon:  · Excessive pain, swelling, redness or odor of or around the surgical area  · Temperature over 100.5  · Nausea and vomiting lasting longer than 4 hours or if unable to take medications  · Any signs of decreased circulation or nerve impairment to extremity: change in color, persistent  numbness, tingling, coldness or increase pain  · Any questions    What to do at Home:  [address]  DR HALIMA WILL CALL REGARDING A POST OP CHECK UP    If you experience any of the following symptoms heavy bleeding, no urine produced, fevers, severe pain, please follow up with dr Shai Grace    *  Please give a list of your current medications to your Primary Care Provider. *  Please update this list whenever your medications are discontinued, doses are      changed, or new medications (including over-the-counter products) are added. *  Please carry medication information at all times in case of emergency situations. These are general instructions for a healthy lifestyle:    No smoking/ No tobacco products/ Avoid exposure to second hand smoke  Surgeon General's Warning:  Quitting smoking now greatly reduces serious risk to your health. Obesity, smoking, and sedentary lifestyle greatly increases your risk for illness    A healthy diet, regular physical exercise & weight monitoring are important for maintaining a healthy lifestyle    You may be retaining fluid if you have a history of heart failure or if you experience any of the following symptoms:  Weight gain of 3 pounds or more overnight or 5 pounds in a week, increased swelling in our hands or feet or shortness of breath while lying flat in bed. Please call your doctor as soon as you notice any of these symptoms; do not wait until your next office visit. The discharge information has been reviewed with the patient and caregiver. The patient and caregiver verbalized understanding. Discharge medications reviewed with the patient and caregiver and appropriate educational materials and side effects teaching were provided.   _    Patient armband removed and shredded__________________________________________________________________________________________________________________________________

## 2020-05-12 NOTE — OP NOTES
[facility]  OPERATIVE REPORT    Name:  Janett Clemons  MR#:   [id number]  :  1935  ACCOUNT #:  [de-identified]  DATE OF SERVICE:  2020    PREOPERATIVE DIAGNOSIS:  Bladder cancer, overlapping sites. POSTOPERATIVE DIAGNOSIS:  Bladder cancer, overlapping sites. PROCEDURE PERFORMED:  Cystoscopy and transurethral resection of bladder tumor (medium). SURGEON:  Michail Schilder, MD    ASSISTANT:  None. ANESTHESIA:  General.    COMPLICATIONS:  None. SPECIMENS REMOVED:  Bladder tumor. IMPLANTS:  None. DRAIN:  A 22-Mauritian Dueñas catheter. ESTIMATED BLOOD LOSS:  Minimal.    FINDINGS:  The patient had a 3-cm area of resection in between two large tumor sites from last month, and then the area of the previous tumor resections were all re-biopsied as well and sent as bladder tumor specimen. There was no active bleeding or injury noted at the end of the case. DISPOSITION:  The patient was taken to Recovery in stable condition. CLINICAL NOTE:  The patient is an 76-year-old female with a history of gross hematuria, who was found to have massive bladder tumor. She underwent a TURBT, large, last month, and she now presents for re-resection because it was impossible to determine whether a total resection had been done. PROCEDURE:  Preoperatively, the risks and benefits of the surgery were described to the patient, where the risks include, but are not limited to, bleeding, infection, injury to the bladder, injury to the ureter, injury to the kidney, possible need for future procedure to be made tumor-free. The patient understood the risks and signed the informed consent. The patient was taken to the operating room and placed on the OR table in supine position. She was administered a general anesthetic. She was administered intravenous antibiotics. She was placed in dorsal lithotomy position and prepped and draped in the usual sterile manner.   A 25-Mauritian resectoscope and sheath were then inserted transurethrally, atraumatically under direct vision using a 30-degree lens and visual obturator. A panendoscopy was done. The bilateral ureteral orifices were in normal anatomic position. There were no stones within the bladder. At the dome, there were two areas of some necrosis consistent with the prior resection. In between, there were two areas at the dome moving over towards the lateral wall. There was an area of raised erythema and little papillary fronds. Using a loop resectoscope, a total of 3-cm area was resected and the area was completely cauterized then with electrocautery. There was no evidence of injury. Using cut biopsy forceps, I then biopsied the previous tumor resection sites and sent that off as well. The area was then re-cauterized as well. There was no injury or active bleeding at the end of the case. The ureters were well away from the area of resection and were unharmed. At this point, the scope was removed. I then passed a 22-Greenlandic Dueñas catheter into the bladder and the bladder was emptied. The patient was then taken out of lithotomy position, revived from anesthesia and taken to recovery in stable condition.       MD MIGUEL ÁNGEL Campo/[email or web address]  D:  05/12/2020 9:06  T:  05/12/2020 10:28  JOB #:  [id number]

## 2020-05-12 NOTE — PERIOP NOTES
TRANSFER - OUT REPORT:    Verbal report given to Juan Villaseñor RN(name) on Judith Hassan  being transferred to phase 2(unit) for routine post - op       Report consisted of patients Situation, Background, Assessment and   Recommendations(SBAR). Information from the following report(s) SBAR, Kardex, OR Summary, Procedure Summary and Intake/Output was reviewed with the receiving nurse. Lines:   Peripheral IV 05/12/20 Posterior;Right Hand (Active)   Site Assessment Clean, dry, & intact 5/12/2020  8:37 AM   Phlebitis Assessment 0 5/12/2020  8:37 AM   Infiltration Assessment 0 5/12/2020  8:37 AM   Dressing Status Clean, dry, & intact 5/12/2020  8:37 AM   Dressing Type Transparent 5/12/2020  8:37 AM   Hub Color/Line Status Pink; Infusing 5/12/2020  8:37 AM   Alcohol Cap Used No 5/12/2020  6:38 AM        Opportunity for questions and clarification was provided.       Patient transported with:   Registered Nurse  Tech

## 2020-05-12 NOTE — OP NOTES
gemcitabine (Gemzar) 1 gram in 0.9% sodium chloride 50 milliliters was not instilled into the bladder. Doctor decide not to instill gemcitabine.

## 2020-06-16 LAB
STRESS BASELINE HR: 68 BPM
STRESS ESTIMATED WORKLOAD: 1 METS
STRESS EXERCISE DUR MIN: NORMAL
STRESS PEAK DIAS BP: 77 MMHG
STRESS PEAK SYS BP: 175 MMHG
STRESS PERCENT HR ACHIEVED: 76 %
STRESS POST PEAK HR: 104 BPM
STRESS RATE PRESSURE PRODUCT: NORMAL BPM*MMHG
STRESS ST DEPRESSION: 0 MM
STRESS ST ELEVATION: 0 MM
STRESS TARGET HR: 136 BPM

## 2021-04-19 ENCOUNTER — HOSPITAL ENCOUNTER (OUTPATIENT)
Dept: PREADMISSION TESTING | Age: 86
Discharge: HOME | End: 2021-04-19
Payer: MEDICARE

## 2021-04-19 ENCOUNTER — TRANSCRIBE ORDER (OUTPATIENT)
Dept: REGISTRATION | Age: 86
End: 2021-04-19

## 2021-04-19 DIAGNOSIS — C67.3 MALIGNANT NEOPLASM OF ANTERIOR WALL OF URINARY BLADDER (HCC): ICD-10-CM

## 2021-04-19 DIAGNOSIS — C67.3 MALIGNANT NEOPLASM OF ANTERIOR WALL OF URINARY BLADDER (HCC): Primary | ICD-10-CM

## 2021-04-19 LAB
ANION GAP SERPL CALC-SCNC: 3 MMOL/L (ref 3–18)
ATRIAL RATE: 68 BPM
BASOPHILS # BLD: 0.1 K/UL (ref 0–0.1)
BASOPHILS NFR BLD: 1 % (ref 0–2)
BUN SERPL-MCNC: 27 MG/DL (ref 7–18)
BUN/CREAT SERPL: 14 (ref 12–20)
CALCIUM SERPL-MCNC: 9.3 MG/DL (ref 8.5–10.1)
CALCULATED P AXIS, ECG09: 73 DEGREES
CALCULATED R AXIS, ECG10: 21 DEGREES
CALCULATED T AXIS, ECG11: 61 DEGREES
CHLORIDE SERPL-SCNC: 108 MMOL/L (ref 100–111)
CO2 SERPL-SCNC: 27 MMOL/L (ref 21–32)
CREAT SERPL-MCNC: 1.97 MG/DL (ref 0.6–1.3)
DIAGNOSIS, 93000: NORMAL
DIFFERENTIAL METHOD BLD: NORMAL
EOSINOPHIL # BLD: 0.2 K/UL (ref 0–0.4)
EOSINOPHIL NFR BLD: 3 % (ref 0–5)
ERYTHROCYTE [DISTWIDTH] IN BLOOD BY AUTOMATED COUNT: 12.6 % (ref 11.6–14.5)
GLUCOSE SERPL-MCNC: 116 MG/DL (ref 74–99)
HCT VFR BLD AUTO: 41.7 % (ref 35–45)
HGB BLD-MCNC: 13.5 G/DL (ref 12–16)
LYMPHOCYTES # BLD: 1.7 K/UL (ref 0.9–3.6)
LYMPHOCYTES NFR BLD: 22 % (ref 21–52)
MCH RBC QN AUTO: 30.4 PG (ref 24–34)
MCHC RBC AUTO-ENTMCNC: 32.4 G/DL (ref 31–37)
MCV RBC AUTO: 93.9 FL (ref 74–97)
MONOCYTES # BLD: 0.5 K/UL (ref 0.05–1.2)
MONOCYTES NFR BLD: 7 % (ref 3–10)
NEUTS SEG # BLD: 4.9 K/UL (ref 1.8–8)
NEUTS SEG NFR BLD: 67 % (ref 40–73)
P-R INTERVAL, ECG05: 166 MS
PLATELET # BLD AUTO: 227 K/UL (ref 135–420)
PMV BLD AUTO: 10 FL (ref 9.2–11.8)
POTASSIUM SERPL-SCNC: 5.4 MMOL/L (ref 3.5–5.5)
Q-T INTERVAL, ECG07: 390 MS
QRS DURATION, ECG06: 100 MS
QTC CALCULATION (BEZET), ECG08: 414 MS
RBC # BLD AUTO: 4.44 M/UL (ref 4.2–5.3)
SODIUM SERPL-SCNC: 138 MMOL/L (ref 136–145)
VENTRICULAR RATE, ECG03: 68 BPM
WBC # BLD AUTO: 7.4 K/UL (ref 4.6–13.2)

## 2021-04-19 PROCEDURE — 36415 COLL VENOUS BLD VENIPUNCTURE: CPT

## 2021-04-19 PROCEDURE — 93005 ELECTROCARDIOGRAM TRACING: CPT

## 2021-04-19 PROCEDURE — 80048 BASIC METABOLIC PNL TOTAL CA: CPT

## 2021-04-19 PROCEDURE — 85025 COMPLETE CBC W/AUTO DIFF WBC: CPT

## 2021-04-27 RX ORDER — MITOMYCIN 20 MG/40ML
40 SYRINGE (ML) INTRAVESICAL ONCE
Status: COMPLETED | OUTPATIENT
Start: 2021-05-04 | End: 2021-05-04

## 2021-04-28 ENCOUNTER — HOSPITAL ENCOUNTER (OUTPATIENT)
Dept: PREADMISSION TESTING | Age: 86
Discharge: HOME | End: 2021-04-28
Payer: MEDICARE

## 2021-04-28 PROCEDURE — U0003 INFECTIOUS AGENT DETECTION BY NUCLEIC ACID (DNA OR RNA); SEVERE ACUTE RESPIRATORY SYNDROME CORONAVIRUS 2 (SARS-COV-2) (CORONAVIRUS DISEASE [COVID-19]), AMPLIFIED PROBE TECHNIQUE, MAKING USE OF HIGH THROUGHPUT TECHNOLOGIES AS DESCRIBED BY CMS-2020-01-R: HCPCS

## 2021-04-29 LAB — SARS-COV-2, COV2NT: NOT DETECTED

## 2021-05-04 ENCOUNTER — ANESTHESIA EVENT (OUTPATIENT)
Dept: SURGERY | Age: 86
End: 2021-05-04
Payer: MEDICARE

## 2021-05-04 ENCOUNTER — ANESTHESIA (OUTPATIENT)
Dept: SURGERY | Age: 86
End: 2021-05-04
Payer: MEDICARE

## 2021-05-04 ENCOUNTER — HOSPITAL ENCOUNTER (OUTPATIENT)
Age: 86
Setting detail: OUTPATIENT SURGERY
Discharge: HOME | End: 2021-05-04
Attending: UROLOGY | Admitting: UROLOGY
Payer: MEDICARE

## 2021-05-04 VITALS
TEMPERATURE: 97.3 F | HEIGHT: 61 IN | RESPIRATION RATE: 16 BRPM | DIASTOLIC BLOOD PRESSURE: 65 MMHG | BODY MASS INDEX: 38.93 KG/M2 | WEIGHT: 206.19 LBS | HEART RATE: 74 BPM | OXYGEN SATURATION: 93 % | SYSTOLIC BLOOD PRESSURE: 115 MMHG

## 2021-05-04 PROCEDURE — 76210000006 HC OR PH I REC 0.5 TO 1 HR: Performed by: UROLOGY

## 2021-05-04 PROCEDURE — 77030020782 HC GWN BAIR PAWS FLX 3M -B: Performed by: UROLOGY

## 2021-05-04 PROCEDURE — 77030034696 HC CATH URETH FOL 2W BARD -A: Performed by: UROLOGY

## 2021-05-04 PROCEDURE — 76210000022 HC REC RM PH II 1.5 TO 2 HR: Performed by: UROLOGY

## 2021-05-04 PROCEDURE — 77030013079 HC BLNKT BAIR HGGR 3M -A: Performed by: STUDENT IN AN ORGANIZED HEALTH CARE EDUCATION/TRAINING PROGRAM

## 2021-05-04 PROCEDURE — 77030040361 HC SLV COMPR DVT MDII -B: Performed by: UROLOGY

## 2021-05-04 PROCEDURE — 76010000138 HC OR TIME 0.5 TO 1 HR: Performed by: UROLOGY

## 2021-05-04 PROCEDURE — 74011250636 HC RX REV CODE- 250/636: Performed by: UROLOGY

## 2021-05-04 PROCEDURE — 74011000250 HC RX REV CODE- 250: Performed by: REGISTERED NURSE

## 2021-05-04 PROCEDURE — 77030012508 HC MSK AIRWY LMA AMBU -A: Performed by: STUDENT IN AN ORGANIZED HEALTH CARE EDUCATION/TRAINING PROGRAM

## 2021-05-04 PROCEDURE — 2709999900 HC NON-CHARGEABLE SUPPLY: Performed by: UROLOGY

## 2021-05-04 PROCEDURE — 88307 TISSUE EXAM BY PATHOLOGIST: CPT

## 2021-05-04 PROCEDURE — 76060000032 HC ANESTHESIA 0.5 TO 1 HR: Performed by: UROLOGY

## 2021-05-04 PROCEDURE — 88305 TISSUE EXAM BY PATHOLOGIST: CPT

## 2021-05-04 PROCEDURE — 74011250636 HC RX REV CODE- 250/636: Performed by: REGISTERED NURSE

## 2021-05-04 RX ORDER — SODIUM CHLORIDE, SODIUM LACTATE, POTASSIUM CHLORIDE, CALCIUM CHLORIDE 600; 310; 30; 20 MG/100ML; MG/100ML; MG/100ML; MG/100ML
50 INJECTION, SOLUTION INTRAVENOUS CONTINUOUS
Status: DISCONTINUED | OUTPATIENT
Start: 2021-05-04 | End: 2021-05-04 | Stop reason: HOSPADM

## 2021-05-04 RX ORDER — SODIUM CHLORIDE, SODIUM LACTATE, POTASSIUM CHLORIDE, CALCIUM CHLORIDE 600; 310; 30; 20 MG/100ML; MG/100ML; MG/100ML; MG/100ML
125 INJECTION, SOLUTION INTRAVENOUS CONTINUOUS
Status: DISCONTINUED | OUTPATIENT
Start: 2021-05-04 | End: 2021-05-04 | Stop reason: HOSPADM

## 2021-05-04 RX ORDER — LIDOCAINE HYDROCHLORIDE 20 MG/ML
INJECTION, SOLUTION EPIDURAL; INFILTRATION; INTRACAUDAL; PERINEURAL AS NEEDED
Status: DISCONTINUED | OUTPATIENT
Start: 2021-05-04 | End: 2021-05-04 | Stop reason: HOSPADM

## 2021-05-04 RX ORDER — NALOXONE HYDROCHLORIDE 0.4 MG/ML
0.04 INJECTION, SOLUTION INTRAMUSCULAR; INTRAVENOUS; SUBCUTANEOUS
Status: DISCONTINUED | OUTPATIENT
Start: 2021-05-04 | End: 2021-05-04 | Stop reason: HOSPADM

## 2021-05-04 RX ORDER — GLYCOPYRROLATE 0.2 MG/ML
INJECTION INTRAMUSCULAR; INTRAVENOUS AS NEEDED
Status: DISCONTINUED | OUTPATIENT
Start: 2021-05-04 | End: 2021-05-04 | Stop reason: HOSPADM

## 2021-05-04 RX ORDER — METOCLOPRAMIDE HYDROCHLORIDE 5 MG/ML
INJECTION INTRAMUSCULAR; INTRAVENOUS AS NEEDED
Status: DISCONTINUED | OUTPATIENT
Start: 2021-05-04 | End: 2021-05-04 | Stop reason: HOSPADM

## 2021-05-04 RX ORDER — EPHEDRINE SULFATE/0.9% NACL/PF 50 MG/5 ML
SYRINGE (ML) INTRAVENOUS AS NEEDED
Status: DISCONTINUED | OUTPATIENT
Start: 2021-05-04 | End: 2021-05-04 | Stop reason: HOSPADM

## 2021-05-04 RX ORDER — FENTANYL CITRATE 50 UG/ML
50 INJECTION, SOLUTION INTRAMUSCULAR; INTRAVENOUS
Status: DISCONTINUED | OUTPATIENT
Start: 2021-05-04 | End: 2021-05-04 | Stop reason: HOSPADM

## 2021-05-04 RX ORDER — FENTANYL CITRATE 50 UG/ML
INJECTION, SOLUTION INTRAMUSCULAR; INTRAVENOUS AS NEEDED
Status: DISCONTINUED | OUTPATIENT
Start: 2021-05-04 | End: 2021-05-04 | Stop reason: HOSPADM

## 2021-05-04 RX ORDER — NALBUPHINE HYDROCHLORIDE 10 MG/ML
5 INJECTION, SOLUTION INTRAMUSCULAR; INTRAVENOUS; SUBCUTANEOUS
Status: DISCONTINUED | OUTPATIENT
Start: 2021-05-04 | End: 2021-05-04 | Stop reason: HOSPADM

## 2021-05-04 RX ORDER — MIDAZOLAM HYDROCHLORIDE 1 MG/ML
INJECTION, SOLUTION INTRAMUSCULAR; INTRAVENOUS AS NEEDED
Status: DISCONTINUED | OUTPATIENT
Start: 2021-05-04 | End: 2021-05-04 | Stop reason: HOSPADM

## 2021-05-04 RX ORDER — SODIUM CHLORIDE 0.9 % (FLUSH) 0.9 %
5-40 SYRINGE (ML) INJECTION AS NEEDED
Status: DISCONTINUED | OUTPATIENT
Start: 2021-05-04 | End: 2021-05-04 | Stop reason: HOSPADM

## 2021-05-04 RX ORDER — DIPHENHYDRAMINE HYDROCHLORIDE 50 MG/ML
12.5 INJECTION, SOLUTION INTRAMUSCULAR; INTRAVENOUS
Status: DISCONTINUED | OUTPATIENT
Start: 2021-05-04 | End: 2021-05-04 | Stop reason: HOSPADM

## 2021-05-04 RX ORDER — CEFAZOLIN SODIUM/WATER 2 G/20 ML
2 SYRINGE (ML) INTRAVENOUS ONCE
Status: COMPLETED | OUTPATIENT
Start: 2021-05-04 | End: 2021-05-04

## 2021-05-04 RX ORDER — PROPOFOL 10 MG/ML
INJECTION, EMULSION INTRAVENOUS AS NEEDED
Status: DISCONTINUED | OUTPATIENT
Start: 2021-05-04 | End: 2021-05-04 | Stop reason: HOSPADM

## 2021-05-04 RX ORDER — CEPHALEXIN 500 MG/1
500 CAPSULE ORAL 2 TIMES DAILY
Qty: 6 CAP | Refills: 0 | Status: SHIPPED | OUTPATIENT
Start: 2021-05-04 | End: 2021-05-07

## 2021-05-04 RX ORDER — ONDANSETRON 2 MG/ML
INJECTION INTRAMUSCULAR; INTRAVENOUS AS NEEDED
Status: DISCONTINUED | OUTPATIENT
Start: 2021-05-04 | End: 2021-05-04 | Stop reason: HOSPADM

## 2021-05-04 RX ORDER — SODIUM CHLORIDE 0.9 % (FLUSH) 0.9 %
5-40 SYRINGE (ML) INJECTION EVERY 8 HOURS
Status: DISCONTINUED | OUTPATIENT
Start: 2021-05-04 | End: 2021-05-04 | Stop reason: HOSPADM

## 2021-05-04 RX ORDER — HYDROMORPHONE HYDROCHLORIDE 1 MG/ML
0.5 INJECTION, SOLUTION INTRAMUSCULAR; INTRAVENOUS; SUBCUTANEOUS AS NEEDED
Status: DISCONTINUED | OUTPATIENT
Start: 2021-05-04 | End: 2021-05-04 | Stop reason: HOSPADM

## 2021-05-04 RX ADMIN — GLYCOPYRROLATE 0.2 MG: 0.2 INJECTION INTRAMUSCULAR; INTRAVENOUS at 09:22

## 2021-05-04 RX ADMIN — CEFAZOLIN 2 G: 1 INJECTION, POWDER, FOR SOLUTION INTRAVENOUS at 09:30

## 2021-05-04 RX ADMIN — SODIUM CHLORIDE, SODIUM LACTATE, POTASSIUM CHLORIDE, AND CALCIUM CHLORIDE: 600; 310; 30; 20 INJECTION, SOLUTION INTRAVENOUS at 09:21

## 2021-05-04 RX ADMIN — METOCLOPRAMIDE 5 MG: 5 INJECTION, SOLUTION INTRAMUSCULAR; INTRAVENOUS at 09:23

## 2021-05-04 RX ADMIN — LIDOCAINE HYDROCHLORIDE 80 MG: 20 INJECTION, SOLUTION EPIDURAL; INFILTRATION; INTRACAUDAL; PERINEURAL at 09:25

## 2021-05-04 RX ADMIN — SODIUM CHLORIDE, SODIUM LACTATE, POTASSIUM CHLORIDE, AND CALCIUM CHLORIDE 125 ML/HR: 600; 310; 30; 20 INJECTION, SOLUTION INTRAVENOUS at 10:18

## 2021-05-04 RX ADMIN — ONDANSETRON HYDROCHLORIDE 4 MG: 2 INJECTION INTRAMUSCULAR; INTRAVENOUS at 09:34

## 2021-05-04 RX ADMIN — FENTANYL CITRATE 50 MCG: 50 INJECTION, SOLUTION INTRAMUSCULAR; INTRAVENOUS at 09:21

## 2021-05-04 RX ADMIN — PROPOFOL 180 MG: 10 INJECTION, EMULSION INTRAVENOUS at 09:25

## 2021-05-04 RX ADMIN — MIDAZOLAM 2 MG: 1 INJECTION INTRAMUSCULAR; INTRAVENOUS at 09:22

## 2021-05-04 RX ADMIN — Medication 40 MG: at 09:55

## 2021-05-04 RX ADMIN — Medication 12.5 MG: at 09:43

## 2021-05-04 RX ADMIN — Medication 12.5 MG: at 09:40

## 2021-05-04 NOTE — PERIOP NOTES
Mitomycin released form the bladder and moreno catheter removed as ordered. Chemotherapy precautions observed.

## 2021-05-04 NOTE — ANESTHESIA POSTPROCEDURE EVALUATION
Post-Anesthesia Evaluation and Assessment    Cardiovascular Function/Vital Signs  Visit Vitals  BP (!) 130/54   Pulse 80   Temp 36.4 °C (97.6 °F)   Resp 18   Ht 5' 1\" (1.549 m)   Wt 93.5 kg (206 lb 3 oz)   SpO2 95%   BMI 38.96 kg/m²       Patient is status post Procedure(s):  CYSTOSCOPY, TRANSURETHRAL RESECTION OF BLADDER TUMOR- SMALL WITH MITOMYCIN. Nausea/Vomiting: Controlled. Postoperative hydration reviewed and adequate. Pain:  Pain Scale 1: Numeric (0 - 10) (05/04/21 1034)  Pain Intensity 1: 0 (05/04/21 1034)   Managed. Neurological Status:   Neuro (WDL): Within Defined Limits (05/04/21 0808)   At baseline. Mental Status and Level of Consciousness: Baseline and appropriate for discharge. Pulmonary Status:   O2 Device: None (Room air) (05/04/21 1034)   Adequate oxygenation and airway patent. Complications related to anesthesia: None    Post-anesthesia assessment completed. No concerns. Patient has met all discharge requirements.     Signed By: Flaquita Riggs MD    May 4, 2021

## 2021-05-04 NOTE — BRIEF OP NOTE
Brief Postoperative Note    Patient: Kike Torres  YOB: 1935  MRN: [id number]    Date of Procedure: 5/4/2021     Pre-Op Diagnosis: BLADDER NEOPLASM UNSPECIFIED    Post-Op Diagnosis: Same as preoperative diagnosis. Procedure(s):  CYSTOSCOPY, TRANSURETHRAL RESECTION OF BLADDER TUMOR- SMALL;  WITH INTRAVESICAL INSTILLATION OF MITOMYCIN    Surgeon(s):  Mala Hughes MD    Surgical Assistant: None    Anesthesia: GENERAL     Estimated Blood Loss (mL): Minimal    Complications: None    Specimens:   ID Type Source Tests Collected by Time Destination   1 : BLADDER TUMOR Fresh Bladder  Mala Hughes MD 5/4/2021 5[address] Pathology        Implants: * No implants in log *    Drains: [address] [place] -- PLUGGED    Findings: 1 CM TUMOR AT ANTERIOR BLADDER WALL ADJACENT TO PRIOR AREA OF RESECTION THAT WAS RESECTED AND CAUTERIZED.     Electronically Signed by Ana Hatfield MD on 5/4/2021 at 10:15 AM

## 2021-05-04 NOTE — PERIOP NOTES
Discharge instructions given to the patient's son via phone call. AVS med list reviewed for duplicates. Opportunity for questions provided.

## 2021-05-04 NOTE — DISCHARGE INSTRUCTIONS
Make sure to maintain all the following chemotherapy precautions for 48 hours:  Wash any skin areas that come in contact with urine with soap and water. Use a separate toilet from others in the household. After urinating, pour 1 cup of bleach in the toilet, close the lid and double flush. Wash all linens and clothing that come in contact with urine separate from others in household. Clean up any urine spills with bleach and water solution. Call Dr. Paul Goes for any postoperative problems or concerns at [phone]         Cystoscopy: What to Expect at [facility]     A cystoscopy is a procedure that lets a doctor look inside of the bladder and the urethra. The urethra is the tube that carries urine from the bladder to outside the body. The doctor uses a thin, lighted tool called a cystoscope. Your bladder is filled with fluid. This stretches the bladder so that your doctor can look closely at the inside of your bladder. After the cystoscopy, your urethra may be sore at first, and it may burn when you urinate for the first few days after the procedure. You may feel the need to urinate more often, and your urine may be pink. These symptoms should get better in 1 or 2 days. You will probably be able to go back to most of your usual activities in 1 or 2 days. This care sheet gives you a general idea about how long it will take for you to recover. But each person recovers at a different pace. Follow the steps below to get better as quickly as possible. How can you care for yourself at home? Activity    · Rest when you feel tired. Getting enough sleep will help you recover.     · Try to walk each day. Start by walking a little more than you did the day before. Bit by bit, increase the amount you walk. Walking boosts blood flow and helps prevent pneumonia and constipation.     · Avoid strenuous activities, such as bicycle riding, jogging, weight lifting, or aerobic exercise, until your doctor says it is okay.   · Ask your doctor when you can drive again.     · Most people are able to return to work within 1 or 2 days after the procedure.     · You may shower and take baths as usual.     · Ask your doctor when it is okay for you to have sex. Diet    · You can eat your normal diet. If your stomach is upset, try bland, low-fat foods like plain rice, broiled chicken, toast, and yogurt.     · Drink plenty of fluids (unless your doctor tells you not to). Medicines    · Take pain medicines exactly as directed. ? If the doctor gave you a prescription medicine for pain, take it as prescribed. ? If you are not taking a prescription pain medicine, ask your doctor if you can take an over-the-counter medicine.     · If you think your pain medicine is making you sick to your stomach:  ? Take your medicine after meals (unless your doctor has told you not to). ? Ask your doctor for a different pain medicine.     · If your doctor prescribed antibiotics, take them as directed. Do not stop taking them just because you feel better. You need to take the full course of antibiotics. Follow-up care is a key part of your treatment and safety. Be sure to make and go to all appointments, and call your doctor if you are having problems. It's also a good idea to know your test results and keep a list of the medicines you take. When should you call for help? Call 911 anytime you think you may need emergency care. For example, call if:    · You passed out (lost consciousness).     · You have severe trouble breathing.     · You have sudden chest pain and shortness of breath, or you cough up blood.     · You have severe belly pain.    Call your doctor now or seek immediate medical care if:    · You are sick to your stomach or cannot keep fluids down.     · Your urine is still red or you see blood clots after you have urinated several times.     · You have trouble passing urine or stool, especially if you have pain or swelling in your lower belly.     · You have signs of a blood clot, such as:  ? Pain in your calf, back of the knee, thigh, or groin. ? Redness and swelling in your leg or groin.     · You develop a fever or severe chills.     · You have pain in your back just below your rib cage. This is called flank pain. Watch closely for changes in your health, and be sure to contact your doctor if:    · You have pain or burning when you urinate. A burning feeling is normal for a day or two after the test, but call if it does not get better.     · You have a frequent urge to urinate but can pass only small amounts of urine.     · Your urine is pink, red, or cloudy, or smells bad. It is normal for the urine to have a pinkish color for a few days after the test, but call if it does not get better. Where can you learn more? Go to http://www.gray.com/  Enter C842 in the search box to learn more about \"Cystoscopy: What to Expect at Home. \"  Current as of: June 29, 2020               Content Version: 12.8  © 7817-6167 [employer]. Care instructions adapted under license by [employer] (which disclaims liability or warranty for this information). If you have questions about a medical condition or this instruction, always ask your healthcare professional. [place] [address] any warranty or liability for your use of this information. DISCHARGE SUMMARY from Nurse    PATIENT INSTRUCTIONS:    After general anesthesia or intravenous sedation, for 24 hours or while taking prescription Narcotics:  · Limit your activities  · Do not drive and operate hazardous machinery  · Do not make important personal or business decisions  · Do  not drink alcoholic beverages  · If you have not urinated within 8 hours after discharge, please contact your surgeon on call.     Report the following to your surgeon:  · Excessive pain, swelling, redness or odor of or around the surgical area  · Temperature over 100.5  · Nausea and vomiting lasting longer than 4 hours or if unable to take medications  · Any signs of decreased circulation or nerve impairment to extremity: change in color, persistent  numbness, tingling, coldness or increase pain  · Any questions    What to do at Home:  Recommended activity: Activity as tolerated and no driving for today,     If you experience any of the following symptoms as per above, please follow up with Dr Grace Steele at [phone]. *  Please give a list of your current medications to your Primary Care Provider. *  Please update this list whenever your medications are discontinued, doses are      changed, or new medications (including over-the-counter products) are added. *  Please carry medication information at all times in case of emergency situations. These are general instructions for a healthy lifestyle:    No smoking/ No tobacco products/ Avoid exposure to second hand smoke  Surgeon General's Warning:  Quitting smoking now greatly reduces serious risk to your health. Obesity, smoking, and sedentary lifestyle greatly increases your risk for illness    A healthy diet, regular physical exercise & weight monitoring are important for maintaining a healthy lifestyle    You may be retaining fluid if you have a history of heart failure or if you experience any of the following symptoms:  Weight gain of 3 pounds or more overnight or 5 pounds in a week, increased swelling in our hands or feet or shortness of breath while lying flat in bed. Please call your doctor as soon as you notice any of these symptoms; do not wait until your next office visit. Patient armband removed and shredded  The discharge information has been reviewed with the patient and caregiver. The patient and caregiver verbalized understanding.   Discharge medications reviewed with the patient and caregiver and appropriate educational materials and side effects teaching were provided.   ___________________________________________________________________________________________________________________________________

## 2021-05-04 NOTE — PERIOP NOTES
Patient states she is unable to recall names of her medications, called son to review mediation list, patient had coreg this morning.

## 2021-05-04 NOTE — H&P
[facility] [address] [address]  Tel: [phone]  Fax: [phone]    Patient : Gerardo Welch   YOB: 1935           Assessment/Plan  # Detail Type Description    1. 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 Patient Plan She seems to have recurrence today. There is a 0.75cm tumor on the anterior bladder wall near the site of previous resection. We discussed cysto and TURBT with intravesical installation of Mitomycin. Risk of bleeding, infection, injury, pain and dysuria was discussed. We will get this done. Her urine is sent for cytology and culture         2. Assessment Personal history of malignant neoplasm of bladder (Z85.51). 3. Other Orders Orders not associated to today's assessments. Plan Orders Urine Culture, Routine to be performed. Obtained on 04/07/2021. Additional Visit Information      This 81year old female presents for Bladder CA. History of Present Illness  1. Bladder CA   The patient is here today for scheduled follow up. The patient's status is worsened. The patient was diagnosed on 04/21/2020. Pertinent history includes being over 36years old,  race and Unknown if ever smoked. The patient  denies chest pain or nausea. Additional information: s/p TURBT for a huge tumor burden at the anterior wall and extending along the dome/psoterior wall and right and left side walls. It was prolonged and difficult. Path (4/21/2020) = High grade Ta TCC    Re-resection Path (5/12/2020) - negative for malignacy, cystitis only    4/2021 - -No new gross hematuria or dysuria or wt loss. Medical/Surgical/Interim History  Reviewed, no change. Last detailed document date:11/16/2020. Problem List  Problem List reviewed.      Medications (active prior to today)  Medication Instructions Start Date Stop Date Refilled Elsewhere   gemcitabine 1 gram/26.3 mL (38 mg/mL) intravenous solution instill 1 gram by intravesical route into the bladder, dilute with 50 ml sodium chloride 05/04/2020 05/04/2020 N   losartan potassium take 1 tablet by oral route  every day //   Y   Synthroid take 1 tablet by oral route  every day //   Y   B12 5,000 mcg-100 mcg sublingual lozenge  //   Y   Vitamin D3 10 mcg (400 unit) capsule  //   Y   Lexapro take 1 tablet by oral route  every day //   Y   tramadol HCl take 1 capsule by oral route  every day //   Y   gabapentin take 3 capsule by oral route 3 times every day //   Y   Lipitor take 1 tablet by oral route  every day //   Y   Bactrim 400 mg-80 mg tablet take 1 tablet by oral route  every 12 hours 06/26/2020   N       Medication Reconciliation  Medications reconciled today. Allergies  Ingredient Reaction (Severity) Medication Name Comment   NO KNOWN ALLERGIES        Reviewed, no changes. Family History  Reviewed, no changes. Last detailed document date:11/16/2020. Social History  Reviewed, no changes. Last detailed document date: 11/16/2020. Review of Systems  System Neg/Pos Details   Constitutional Negative Chills and Fever. ENMT Negative Ear infections and Sore throat. Eyes Negative Blurred vision, Double vision and Eye pain. Respiratory Negative Asthma, Chronic cough, Dyspnea and Wheezing. Cardio Negative Chest pain. GI Negative Constipation, Decreased appetite, Diarrhea, Nausea and Vomiting. Endocrine Negative Cold intolerance, Heat intolerance, Increased thirst and Weight loss. Neuro Negative Headache and Tremors. Psych Negative Anxiety and Depression. Integumentary Negative Itching skin and Rash. MS Negative Back pain and Joint pain. Hema/Lymph Negative Easy bleeding.        Vital Signs   Height  Time ft in cm Last Measured Height Position   10:39 AM 5.0 1.00 154.94 04/16/2020 0     Weight/BSA/BMI  Time lb oz kg Context BMI kg/m2 BSA m2   10:39 .00  94.347  39.30      Measured by  Time Measured by   10:39 AM Vito Hill [facility]     Physical Exam  Exam Findings Details   Constitutional Normal Well developed. Neck Exam Normal Inspection - Normal.   Respiratory Normal Inspection - Normal.   Abdomen Normal No abdominal tenderness. Genitourinary Normal Urethral meatus - Normal. External genitalia - Normal. Glands - Normal. Perineum - Normal. Anus - Normal. Vagina - Normal. No Suprapubic tenderness. No CVA tenderness. Extremity Normal No Edema. Psychiatric Normal Orientation - Oriented to time, place, person & situation. Appropriate mood and affect. Cystoscopy indication  Bladder. Patient consent  Consent was obtained. The procedure and risks were explained in detail. Questions were encouraged and answered. The patient was prepped and draped in the usual sterile fashion. Procedure  A diagnostic cystourethroscopy was performed using a 16 Arabic flexible cystoscope    Anesthesia  No anesthesia. Patient position  Dorsal lithotomy. Patient response  Patient tolerated procedure well. Patient was given instructions. Patient was discharged in stable condition. Findings  Anterior urethra normal in appearance. The bladder has lesion/mass found on the bladder. The first lesion/mass is 0.75cm is located anterior bladder neck of the bladder with characteristics of papillary. Ureteral orifices normal in appearance. Antibiotics  Antibiotics given:  Cipro    Impression  Personal history of cancer of bladder Z85.51.         Medications (added, continued, or stopped today)  Start Date Medication Directions PRN Status PRN Reason Instruction Stop Date    B12 5,000 mcg-100 mcg sublingual lozenge  N      06/26/2020 Bactrim 400 mg-80 mg tablet take 1 tablet by oral route  every 12 hours N       gabapentin take 3 capsule by oral route 3 times every day N      05/04/2020 gemcitabine 1 gram/26.3 mL (38 mg/mL) intravenous solution instill 1 gram by intravesical route into the bladder, dilute with 50 ml sodium chloride N       Lexapro take 1 tablet by oral route  every day N       Lipitor take 1 tablet by oral route  every day N       losartan potassium take 1 tablet by oral route  every day N       Synthroid take 1 tablet by oral route  every day N       tramadol HCl take 1 capsule by oral route  every day N       Vitamin D3 10 mcg (400 unit) capsule  N          Active Patient Care Team Members  Name Contact Agency Type Support Role Relationship Active Date Inactive Date Specialty   April [place]   Emergency Contact Child, Mother is the Patient      Lynn Danielle   Patient provider PCP          Provider:      Rachna Pereira MD

## 2021-05-04 NOTE — ANESTHESIA PREPROCEDURE EVALUATION
Relevant Problems   No relevant active problems       Anesthetic History   No history of anesthetic complications     Pertinent negatives: No PONV       Review of Systems / Medical History  Patient summary reviewed, nursing notes reviewed and pertinent labs reviewed    Pulmonary    COPD      Smoker    Pertinent negatives: No asthma and sleep apnea     Neuro/Psych       CVA: no residual symptoms  TIA, psychiatric history (Depression) and dementia  Pertinent negatives: No seizures   Cardiovascular    Hypertension            Pertinent negatives: No past MI and CHF  Exercise tolerance: >4 METS     GI/Hepatic/Renal  Within defined limits           Pertinent negatives: No liver disease and renal disease   Endo/Other      Hypothyroidism  Morbid obesity and arthritis  Pertinent negatives: No diabetes   Other Findings              Physical Exam    Airway  Mallampati: III  TM Distance: < 4 cm  Neck ROM: decreased range of motion   Mouth opening: Diminished (comment)     Cardiovascular  Regular rate and rhythm,  S1 and S2 normal,  no murmur, click, rub, or gallop  Rhythm: regular  Rate: normal         Dental    Dentition: Edentulous     Pulmonary  Breath sounds clear to auscultation               Abdominal  GI exam deferred       Other Findings            Anesthetic Plan    ASA: 3  Anesthesia type: general          Induction: Intravenous  Anesthetic plan and risks discussed with: Patient

## 2021-05-05 NOTE — OP NOTES
[facility]  OPERATIVE REPORT    Name:  Vance See  MR#:   [id number]  :  1935  ACCOUNT #:  [de-identified]  DATE OF SERVICE:  2021    PREOPERATIVE DIAGNOSIS:  Bladder neoplasm, unspecified. POSTOPERATIVE DIAGNOSIS:  Bladder neoplasm, unspecified. PROCEDURES PERFORMED:  Cystoscopy, transurethral resection of bladder tumor small with intravesical instillation of mitomycin. SURGEON:  Kaitlyn Mcpherson MD    ASSISTANT:  None. ANESTHESIA:  General.    COMPLICATIONS:  None. SPECIMENS REMOVED:  Bladder tumor. IMPLANTS:  None. ESTIMATED BLOOD LOSS:  Minimal.    DRAINS:  A 20-[place] Dueñas and it was plugged. FINDINGS:  The patient had a 1-cm tumor at the anterior bladder wall that was adjacent to the prior area of resection. It was resected and cauterized. CLINICAL NOTE:  The patient is an 81-year-old female with a history of bladder cancer who was found to have a somewhat sessile area of raised erythema/tumor adjacent to the prior resection site. She presents for TURBT. PROCEDURE:  Preoperatively, the risks and benefits of surgery were described to the patient. The risks included, but were not limited to bleeding, infection, injury to the bladder, injury to the urethra, and possible need for future procedures. The patient understood the risks and signed the informed consent. The patient was taken to the operating room and placed on the OR table in supine position. She was administered general anesthetic. She was administered intravenous antibiotics. She was placed in dorsal lithotomy position and prepped and draped in the usual sterile manner. A 22-[place] cystoscope and sheath were then inserted transurethrally atraumatically under direct vision using a 30-degree lens. Panendoscopy was done. Bilateral ureteral orifices were in normal anatomic position. There were no stones within the bladder.   There were no truly papillary tumors, but there was an area of raised erythema on the anterior bladder wall adjacent to the prior area of resection. This was 1 cm in size. Using cup biopsy forceps, this was resected by grabbing multiple bites consecutively. The whole area was then cauterized. At the end of the case, there was no tumor left behind. At this point, the area was cauterized again. The bladder was emptied and filled and emptied and filled, and there was no significant bleeding. I then removed the scope. I then placed a 20-[place] Dueñas catheter to completely empty the bladder. The catheter was then filled with 40 mg of mitomycin and the catheter was plugged. The patient was then taken out of lithotomy position, revived from anesthesia, and taken to Recovery in stable condition.       Addis Mao MD      [id number]  D:  05/04/2021 10:24  T:  05/04/2021 20:05  JOB #:  [id number]

## 2022-03-18 PROBLEM — N32.89 BLADDER MASS: Status: ACTIVE | Noted: 2020-04-21

## 2022-03-19 PROBLEM — C67.8 MALIGNANT NEOPLASM OF OVERLAPPING SITES OF BLADDER (HCC): Status: ACTIVE | Noted: 2020-05-12

## 2023-05-12 RX ORDER — ATORVASTATIN CALCIUM 10 MG/1
20 TABLET, FILM COATED ORAL NIGHTLY
COMMUNITY

## 2023-05-12 RX ORDER — ESCITALOPRAM OXALATE 10 MG/1
10 TABLET ORAL DAILY
COMMUNITY

## 2023-05-12 RX ORDER — MEMANTINE HYDROCHLORIDE 10 MG/1
TABLET ORAL 2 TIMES DAILY
COMMUNITY

## 2023-05-12 RX ORDER — LOSARTAN POTASSIUM 50 MG/1
50 TABLET ORAL DAILY
COMMUNITY

## 2023-05-12 RX ORDER — CARVEDILOL 12.5 MG/1
TABLET ORAL 2 TIMES DAILY WITH MEALS
COMMUNITY

## 2023-05-12 RX ORDER — LEVOTHYROXINE SODIUM 0.1 MG/1
TABLET ORAL
COMMUNITY

## 2023-05-12 RX ORDER — GABAPENTIN 600 MG/1
TABLET ORAL
COMMUNITY

## 2023-05-12 RX ORDER — TRAMADOL HYDROCHLORIDE 50 MG/1
TABLET ORAL 2 TIMES DAILY
COMMUNITY

## (undated) DEVICE — GARMENT,MEDLINE,DVT,INT,CALF,MED, GEN2: Brand: MEDLINE

## (undated) DEVICE — STERILE LATEX POWDER-FREE SURGICAL GLOVESWITH NITRILE COATING: Brand: PROTEXIS

## (undated) DEVICE — STRAP,POSITIONING,KNEE/BODY,FOAM,4X60": Brand: MEDLINE

## (undated) DEVICE — SOLUTION IRRIG 3000ML H2O STRL BAG

## (undated) DEVICE — REM POLYHESIVE ADULT PATIENT RETURN ELECTRODE: Brand: VALLEYLAB

## (undated) DEVICE — BAG URIN LEG DISPOZ-A-BG 19OZ -- W/18IN EXT TUBING

## (undated) DEVICE — BASIC SINGLE BASIN 1-LF: Brand: MEDLINE INDUSTRIES, INC.

## (undated) DEVICE — MARKER,SKIN,WI/RULER AND LABELS: Brand: MEDLINE

## (undated) DEVICE — CATHETER URETH 22FR BLLN 30CC SIL HYDRGEL 3 W F SPEC SHT

## (undated) DEVICE — CYSTO PACK: Brand: MEDLINE INDUSTRIES, INC.

## (undated) DEVICE — SKIN MARKER,REGULAR TIP WITH RULER AND LABELS: Brand: DEVON

## (undated) DEVICE — CATHETER URETH 20FR BLLN 5CC STD LTX HYDRGEL 2 W F BARDX

## (undated) DEVICE — CATHETER,FOLEY,SILI-ELAST,LTX,22FR,10ML: Brand: MEDLINE

## (undated) DEVICE — CATH URETH FOL 2W SH 22FRX5ML -- CONVERT TO ITEM 363075

## (undated) DEVICE — PLUG CATH TAPR GRP HNDL CAP